# Patient Record
Sex: MALE | Race: WHITE | Employment: UNEMPLOYED | URBAN - METROPOLITAN AREA
[De-identification: names, ages, dates, MRNs, and addresses within clinical notes are randomized per-mention and may not be internally consistent; named-entity substitution may affect disease eponyms.]

---

## 2017-04-11 ENCOUNTER — OFFICE VISIT (OUTPATIENT)
Dept: FAMILY MEDICINE CLINIC | Age: 42
End: 2017-04-11

## 2017-04-11 VITALS
SYSTOLIC BLOOD PRESSURE: 110 MMHG | HEART RATE: 89 BPM | TEMPERATURE: 97.6 F | BODY MASS INDEX: 24.87 KG/M2 | DIASTOLIC BLOOD PRESSURE: 72 MMHG | HEIGHT: 72 IN | RESPIRATION RATE: 18 BRPM | WEIGHT: 183.6 LBS

## 2017-04-11 DIAGNOSIS — J30.2 SEASONAL ALLERGIC RHINITIS, UNSPECIFIED ALLERGIC RHINITIS TRIGGER: Primary | ICD-10-CM

## 2017-04-11 PROCEDURE — 99212 OFFICE O/P EST SF 10 MIN: CPT | Performed by: NURSE PRACTITIONER

## 2017-04-11 RX ORDER — MONTELUKAST SODIUM 10 MG/1
10 TABLET ORAL NIGHTLY
Qty: 30 TABLET | Refills: 3 | Status: SHIPPED | OUTPATIENT
Start: 2017-04-11 | End: 2019-03-08 | Stop reason: SDUPTHER

## 2017-04-11 ASSESSMENT — PATIENT HEALTH QUESTIONNAIRE - PHQ9
1. LITTLE INTEREST OR PLEASURE IN DOING THINGS: 0
SUM OF ALL RESPONSES TO PHQ9 QUESTIONS 1 & 2: 0
SUM OF ALL RESPONSES TO PHQ QUESTIONS 1-9: 0
2. FEELING DOWN, DEPRESSED OR HOPELESS: 0

## 2017-04-11 ASSESSMENT — ENCOUNTER SYMPTOMS
RHINORRHEA: 1
COUGH: 1
WHEEZING: 0

## 2017-04-12 ENCOUNTER — TELEPHONE (OUTPATIENT)
Dept: FAMILY MEDICINE CLINIC | Age: 42
End: 2017-04-12

## 2017-04-13 RX ORDER — FLUTICASONE PROPIONATE 50 MCG
2 SPRAY, SUSPENSION (ML) NASAL DAILY
Qty: 1 BOTTLE | Refills: 0 | Status: SHIPPED | OUTPATIENT
Start: 2017-04-13 | End: 2019-03-08 | Stop reason: SDUPTHER

## 2018-01-12 ENCOUNTER — OFFICE VISIT (OUTPATIENT)
Dept: FAMILY MEDICINE CLINIC | Age: 43
End: 2018-01-12

## 2018-01-12 ENCOUNTER — TELEPHONE (OUTPATIENT)
Dept: FAMILY MEDICINE CLINIC | Age: 43
End: 2018-01-12

## 2018-01-12 VITALS
DIASTOLIC BLOOD PRESSURE: 68 MMHG | HEART RATE: 72 BPM | HEIGHT: 72 IN | SYSTOLIC BLOOD PRESSURE: 126 MMHG | TEMPERATURE: 97.6 F | OXYGEN SATURATION: 98 % | BODY MASS INDEX: 23.16 KG/M2 | WEIGHT: 171 LBS | RESPIRATION RATE: 16 BRPM

## 2018-01-12 DIAGNOSIS — M25.511 CHRONIC RIGHT SHOULDER PAIN: ICD-10-CM

## 2018-01-12 DIAGNOSIS — Z72.0 TOBACCO ABUSE: ICD-10-CM

## 2018-01-12 DIAGNOSIS — G89.29 CHRONIC BILATERAL LOW BACK PAIN WITHOUT SCIATICA: ICD-10-CM

## 2018-01-12 DIAGNOSIS — K21.9 GASTROESOPHAGEAL REFLUX DISEASE WITHOUT ESOPHAGITIS: Primary | ICD-10-CM

## 2018-01-12 DIAGNOSIS — R10.84 GENERALIZED ABDOMINAL PAIN: Primary | ICD-10-CM

## 2018-01-12 DIAGNOSIS — D68.59 HYPERCOAGULABLE STATE (HCC): ICD-10-CM

## 2018-01-12 DIAGNOSIS — G89.29 CHRONIC RIGHT SHOULDER PAIN: ICD-10-CM

## 2018-01-12 DIAGNOSIS — M54.50 CHRONIC BILATERAL LOW BACK PAIN WITHOUT SCIATICA: ICD-10-CM

## 2018-01-12 DIAGNOSIS — G89.4 CHRONIC PAIN SYNDROME: ICD-10-CM

## 2018-01-12 PROCEDURE — 99214 OFFICE O/P EST MOD 30 MIN: CPT | Performed by: FAMILY MEDICINE

## 2018-01-12 PROCEDURE — G8484 FLU IMMUNIZE NO ADMIN: HCPCS | Performed by: FAMILY MEDICINE

## 2018-01-12 PROCEDURE — 4004F PT TOBACCO SCREEN RCVD TLK: CPT | Performed by: FAMILY MEDICINE

## 2018-01-12 PROCEDURE — G8427 DOCREV CUR MEDS BY ELIG CLIN: HCPCS | Performed by: FAMILY MEDICINE

## 2018-01-12 PROCEDURE — G8420 CALC BMI NORM PARAMETERS: HCPCS | Performed by: FAMILY MEDICINE

## 2018-01-12 RX ORDER — NICOTINE 21 MG/24HR
1 PATCH, TRANSDERMAL 24 HOURS TRANSDERMAL EVERY 24 HOURS
Qty: 30 PATCH | Refills: 3 | Status: SHIPPED | OUTPATIENT
Start: 2018-01-12 | End: 2019-01-12

## 2018-01-12 RX ORDER — PANTOPRAZOLE SODIUM 40 MG/1
TABLET, DELAYED RELEASE ORAL
COMMUNITY
Start: 2018-01-06 | End: 2019-03-08 | Stop reason: SDUPTHER

## 2018-01-12 NOTE — PROGRESS NOTES
Chief Complaint   Patient presents with    Follow-Up from Hospital     GI ISSUES.   lbp x years  This is relatively stable  In er xrays with oa  Hx of some type of back injury  Lifts a lot  Some pain down both legs  No loss of bowel bladder control  Wants more eval    abd pain has been ongoing  to er x 2  had egd  no lesions  May need further GI evaluation    Shoulder pain right  No trauma  No radiation  No neck pain    Sob has been stable  Rad hx  No cough/wheeze    Smokes  Must quit    Factor V def  abnl bw  No current sx  Needs hem eval    Here with spouse      Patient presents for  exam.        Patient Active Problem List   Diagnosis    Bilateral low back pain without sciatica    Right shoulder pain    Tobacco abuse    Chronic pain syndrome    Hypercoagulable state ([facility] [address]       has a current medication list which includes the following prescription(s): pantoprazole, nicotine, beclomethasone, albuterol sulfate hfa, mometasone, fluticasone, montelukast, budesonide, gabapentin, ibuprofen, and fluticasone. Past Medical History:   Diagnosis Date    ADHD (attention deficit hyperactivity disorder)        Past Surgical History:   Procedure Laterality Date    KNEE ARTHROSCOPY Right 1994    NOSE SURGERY Bilateral 1993    TONSILLECTOMY Bilateral 1982       family history includes Arthritis in his mother; Asthma in his mother; Diabetes in his mother; High Blood Pressure in his mother; High Cholesterol in his mother. Social History     Social History    Marital status: Single     Spouse name: N/A    Number of children: N/A    Years of education: N/A     Occupational History    Not on file.      Social History Main Topics    Smoking status: Current Every Day Smoker    Smokeless tobacco: Current User    Alcohol use 7.2 oz/week     12 Standard drinks or equivalent per week    Drug use: No    Sexual activity: Not on file     Other Topics Concern    Not on file     Social History Narrative    No narrative

## 2018-01-12 NOTE — TELEPHONE ENCOUNTER
Patient was given referral for gastro with the wrong diagnosis. Referral needs to be done to [facility] for Abdominal Pain. Please advise.

## 2018-04-30 ENCOUNTER — TELEPHONE (OUTPATIENT)
Dept: FAMILY MEDICINE CLINIC | Age: 43
End: 2018-04-30

## 2018-05-09 ENCOUNTER — TELEPHONE (OUTPATIENT)
Dept: FAMILY MEDICINE CLINIC | Age: 43
End: 2018-05-09

## 2019-03-08 ENCOUNTER — OFFICE VISIT (OUTPATIENT)
Dept: FAMILY MEDICINE CLINIC | Age: 44
End: 2019-03-08
Payer: MEDICAID

## 2019-03-08 VITALS
WEIGHT: 169 LBS | HEART RATE: 97 BPM | HEIGHT: 71 IN | TEMPERATURE: 98.3 F | OXYGEN SATURATION: 98 % | DIASTOLIC BLOOD PRESSURE: 84 MMHG | RESPIRATION RATE: 12 BRPM | BODY MASS INDEX: 23.66 KG/M2 | SYSTOLIC BLOOD PRESSURE: 130 MMHG

## 2019-03-08 DIAGNOSIS — M54.50 CHRONIC BILATERAL LOW BACK PAIN WITHOUT SCIATICA: Primary | ICD-10-CM

## 2019-03-08 DIAGNOSIS — G89.29 CHRONIC RIGHT SHOULDER PAIN: ICD-10-CM

## 2019-03-08 DIAGNOSIS — M25.511 CHRONIC RIGHT SHOULDER PAIN: ICD-10-CM

## 2019-03-08 DIAGNOSIS — D68.59 HYPERCOAGULABLE STATE (HCC): ICD-10-CM

## 2019-03-08 DIAGNOSIS — G89.4 CHRONIC PAIN SYNDROME: ICD-10-CM

## 2019-03-08 DIAGNOSIS — G89.29 CHRONIC BILATERAL LOW BACK PAIN WITHOUT SCIATICA: Primary | ICD-10-CM

## 2019-03-08 DIAGNOSIS — Z72.0 TOBACCO ABUSE: ICD-10-CM

## 2019-03-08 PROCEDURE — 4004F PT TOBACCO SCREEN RCVD TLK: CPT | Performed by: FAMILY MEDICINE

## 2019-03-08 PROCEDURE — G8427 DOCREV CUR MEDS BY ELIG CLIN: HCPCS | Performed by: FAMILY MEDICINE

## 2019-03-08 PROCEDURE — 99214 OFFICE O/P EST MOD 30 MIN: CPT | Performed by: FAMILY MEDICINE

## 2019-03-08 PROCEDURE — G8420 CALC BMI NORM PARAMETERS: HCPCS | Performed by: FAMILY MEDICINE

## 2019-03-08 PROCEDURE — G8484 FLU IMMUNIZE NO ADMIN: HCPCS | Performed by: FAMILY MEDICINE

## 2019-03-08 RX ORDER — ALBUTEROL SULFATE 90 UG/1
2 AEROSOL, METERED RESPIRATORY (INHALATION) EVERY 6 HOURS PRN
Qty: 1 INHALER | Refills: 3 | Status: SHIPPED | OUTPATIENT
Start: 2019-03-08

## 2019-03-08 RX ORDER — VARENICLINE TARTRATE 25 MG
KIT ORAL
Qty: 53 TABLET | Refills: 0 | Status: SHIPPED | OUTPATIENT
Start: 2019-03-08

## 2019-03-08 RX ORDER — VARENICLINE TARTRATE 1 MG/1
1 TABLET, FILM COATED ORAL 2 TIMES DAILY
Qty: 60 TABLET | Refills: 2 | Status: SHIPPED | OUTPATIENT
Start: 2019-03-08 | End: 2019-06-06

## 2019-03-08 RX ORDER — MONTELUKAST SODIUM 10 MG/1
10 TABLET ORAL NIGHTLY
Qty: 30 TABLET | Refills: 3 | Status: SHIPPED | OUTPATIENT
Start: 2019-03-08

## 2019-03-08 RX ORDER — FLUTICASONE PROPIONATE 50 MCG
2 SPRAY, SUSPENSION (ML) NASAL DAILY
Qty: 1 BOTTLE | Refills: 0 | Status: SHIPPED | OUTPATIENT
Start: 2019-03-08

## 2019-03-08 RX ORDER — PANTOPRAZOLE SODIUM 40 MG/1
40 TABLET, DELAYED RELEASE ORAL DAILY
Qty: 30 TABLET | Refills: 3 | Status: SHIPPED | OUTPATIENT
Start: 2019-03-08

## 2019-03-08 RX ORDER — GABAPENTIN 100 MG/1
100 CAPSULE ORAL 3 TIMES DAILY
Qty: 90 CAPSULE | Refills: 3 | Status: SHIPPED | OUTPATIENT
Start: 2019-03-08 | End: 2019-04-07

## 2019-03-08 RX ORDER — FLUTICASONE PROPIONATE 110 UG/1
2 AEROSOL, METERED RESPIRATORY (INHALATION) 2 TIMES DAILY
Qty: 3 INHALER | Refills: 3 | Status: SHIPPED | OUTPATIENT
Start: 2019-03-08 | End: 2020-03-07

## 2019-03-08 ASSESSMENT — PATIENT HEALTH QUESTIONNAIRE - PHQ9
2. FEELING DOWN, DEPRESSED OR HOPELESS: 0
SUM OF ALL RESPONSES TO PHQ QUESTIONS 1-9: 0
1. LITTLE INTEREST OR PLEASURE IN DOING THINGS: 0
SUM OF ALL RESPONSES TO PHQ9 QUESTIONS 1 & 2: 0
SUM OF ALL RESPONSES TO PHQ QUESTIONS 1-9: 0

## 2019-03-12 ENCOUNTER — TELEPHONE (OUTPATIENT)
Dept: FAMILY MEDICINE CLINIC | Age: 44
End: 2019-03-12

## 2021-11-16 ENCOUNTER — OFFICE VISIT (OUTPATIENT)
Dept: INFECTIOUS DISEASES | Age: 46
End: 2021-11-16
Payer: MEDICAID

## 2021-11-16 VITALS
HEART RATE: 77 BPM | SYSTOLIC BLOOD PRESSURE: 120 MMHG | DIASTOLIC BLOOD PRESSURE: 77 MMHG | TEMPERATURE: 98.2 F | WEIGHT: 176 LBS | BODY MASS INDEX: 24.55 KG/M2

## 2021-11-16 DIAGNOSIS — F19.10 DRUG ABUSE (HCC): ICD-10-CM

## 2021-11-16 DIAGNOSIS — Z01.84 LACK OF IMMUNITY TO HEPATITIS B VIRUS DEMONSTRATED BY SEROLOGIC TEST: ICD-10-CM

## 2021-11-16 DIAGNOSIS — B18.2 CHRONIC HEPATITIS C WITHOUT HEPATIC COMA (HCC): Primary | ICD-10-CM

## 2021-11-16 PROCEDURE — G8427 DOCREV CUR MEDS BY ELIG CLIN: HCPCS | Performed by: INTERNAL MEDICINE

## 2021-11-16 PROCEDURE — 4004F PT TOBACCO SCREEN RCVD TLK: CPT | Performed by: INTERNAL MEDICINE

## 2021-11-16 PROCEDURE — 99204 OFFICE O/P NEW MOD 45 MIN: CPT | Performed by: INTERNAL MEDICINE

## 2021-11-16 PROCEDURE — G8420 CALC BMI NORM PARAMETERS: HCPCS | Performed by: INTERNAL MEDICINE

## 2021-11-16 PROCEDURE — G8484 FLU IMMUNIZE NO ADMIN: HCPCS | Performed by: INTERNAL MEDICINE

## 2021-11-16 ASSESSMENT — ENCOUNTER SYMPTOMS: ABDOMINAL PAIN: 1

## 2021-11-16 NOTE — PROGRESS NOTES
Jeremy Collins (:  1975) is a 40 y.o. male,New patient, here for evaluation of the following chief complaint(s):  No chief complaint on file. ASSESSMENT/PLAN:  Chronic active hepatitis C  Drug abuse  Lack of hepatitis B vaccination    Hepatitis AB vaccination x3  A lengthy discussion was done with pt about Hep C illness, mode of transmission, treatment and side effects  Over emphasized the need to be drug-free because of risk of reinfection post treatment  Hepatitis C genotype  FibroScan  Urine for drug screen and alcohol screen x2  Epclusa 1 tablet once daily for 12 weeks  Follow-up CBC complete metabolic profile and hep C viral load 4 weeks after starting medication and follow-up with me 6 weeks after starting medication  Patient was instructed to abstain from drugs  Patient is going to get his family tested for hepatitis C  Subjective   SUBJECTIVE/OBJECTIVE:  HPI  Referred by  for HCV. Recently diagnosed and never treated. History of sniffing cocaine and smoking marijuana. Denies any history of alcohol abuse. Smokes 1 pack/day of cigarettes. Has been having intermittent chest pain, is in the process of getting a stress test.  Has history of peptic ulcer disease and was not taking his medications. Restarted taking them yesterday.      Past Medical History:   Diagnosis Date    ADHD (attention deficit hyperactivity disorder)    Peptic ulcer disease  Past Surgical History:   Procedure Laterality Date    KNEE ARTHROSCOPY Right     NOSE SURGERY Bilateral     TONSILLECTOMY Bilateral    Right shoulder surgery  Social History     Socioeconomic History    Marital status: Single     Spouse name: Not on file    Number of children: Not on file    Years of education: Not on file    Highest education level: Not on file   Occupational History    Not on file   Tobacco Use    Smoking status: Current Every Day Smoker    Smokeless tobacco: Current User   Vaping Use    Vaping Use: Every day    Start date: 3/8/1993    Substances: Never   Substance and Sexual Activity    Alcohol use: Yes     Alcohol/week: 12.0 standard drinks     Types: 12 Standard drinks or equivalent per week    Drug use: No    Sexual activity: Not on file   Other Topics Concern    Not on file   Social History Narrative    Not on file     Social Determinants of Health     Financial Resource Strain:     Difficulty of Paying Living Expenses: Not on file   Food Insecurity:     Worried About Running Out of Food in the Last Year: Not on file    Jaimie of Food in the Last Year: Not on file   Transportation Needs:     Lack of Transportation (Medical): Not on file    Lack of Transportation (Non-Medical):  Not on file   Physical Activity:     Days of Exercise per Week: Not on file    Minutes of Exercise per Session: Not on file   Stress:     Feeling of Stress : Not on file   Social Connections:     Frequency of Communication with Friends and Family: Not on file    Frequency of Social Gatherings with Friends and Family: Not on file    Attends Congregational Services: Not on file    Active Member of Clubs or Organizations: Not on file    Attends Club or Organization Meetings: Not on file    Marital Status: Not on file   Intimate Partner Violence:     Fear of Current or Ex-Partner: Not on file    Emotionally Abused: Not on file    Physically Abused: Not on file    Sexually Abused: Not on file   Housing Stability:     Unable to Pay for Housing in the Last Year: Not on file    Number of [place] in the Last Year: Not on file    Unstable Housing in the Last Year: Not on file     Family History   Problem Relation Age of Onset    Arthritis Mother     Asthma Mother     Diabetes Mother     High Blood Pressure Mother     High Cholesterol Mother      No Known Allergies  Current Outpatient Medications on File Prior to Visit   Medication Sig Dispense Refill    pantoprazole (PROTONIX) 40 MG tablet Take 1 tablet by mouth daily 30 tablet 3    fluticasone (FLONASE) 50 MCG/ACT nasal spray 2 sprays by Nasal route daily 1 Bottle 0    montelukast (SINGULAIR) 10 MG tablet Take 1 tablet by mouth nightly 30 tablet 3    budesonide (RHINOCORT AQUA) 32 MCG/ACT nasal spray 2 sprays by Nasal route daily 1 Bottle 0    gabapentin (NEURONTIN) 100 MG capsule Take 1 capsule by mouth 3 times daily for 30 days. 90 capsule 3    beclomethasone (QVAR) 80 MCG/ACT inhaler Inhale 1 puff into the lungs 2 times daily 1 Inhaler 3    albuterol sulfate HFA (PROAIR HFA) 108 (90 Base) MCG/ACT inhaler Inhale 2 puffs into the lungs every 6 hours as needed for Wheezing 1 Inhaler 3    fluticasone (FLOVENT HFA) 110 MCG/ACT inhaler Inhale 2 puffs into the lungs 2 times daily 3 Inhaler 3    varenicline (CHANTIX CONTINUING MONTH AGAPITO) 1 MG tablet Take 1 tablet by mouth 2 times daily 60 tablet 2    varenicline (CHANTIX STARTING MONTH AGAPITO) 0.5 MG X 11 & 1 MG X 42 tablet Take by mouth. 53 tablet 0    nicotine (NICODERM CQ) 21 MG/24HR Place 1 patch onto the skin every 24 hours 30 patch 3     No current facility-administered medications on file prior to visit. Review of Systems   Cardiovascular: Positive for chest pain. Gastrointestinal: Positive for abdominal pain. All other systems reviewed and are negative. Objective   Physical Exam  Vitals and nursing note reviewed. Constitutional:       General: He is not in acute distress. Appearance: Normal appearance. HENT:      Head: Normocephalic and atraumatic. Nose: No congestion. Eyes:      General: No scleral icterus. Cardiovascular:      Rate and Rhythm: Normal rate and regular rhythm. Heart sounds: No murmur heard. Pulmonary:      Effort: Pulmonary effort is normal. No respiratory distress. Breath sounds: Normal breath sounds. No wheezing, rhonchi or rales. Abdominal:      General: Abdomen is flat. There is no distension. Palpations: Abdomen is soft.  There is no fluid wave, hepatomegaly, splenomegaly, mass or pulsatile mass. Tenderness: There is no abdominal tenderness. Musculoskeletal:         General: No swelling. Cervical back: No rigidity. Right lower leg: No edema. Left lower leg: No edema. Lymphadenopathy:      Cervical: No cervical adenopathy. Skin:     Coloration: Skin is not jaundiced. Findings: No erythema or rash. Neurological:      General: No focal deficit present. Mental Status: He is alert and oriented to person, place, and time. Cranial Nerves: No cranial nerve deficit. Motor: No weakness. Gait: Gait normal.   Psychiatric:         Mood and Affect: Mood normal.         Behavior: Behavior normal.         Thought Content: Thought content normal.         Judgment: Judgment normal.                  HIV screen and hepatitis B surface antibody are negative  Tox screen positive for THC and cocaine 09/2021    On this date 11/16/2021 I have spent 45 minutes reviewing previous notes, test results and face to face with the patient discussing the diagnosis and importance of compliance with the treatment plan as well as documenting on the day of the visit. An electronic signature was used to authenticate this note.     --Ce Nelson MD

## 2021-11-23 LAB — ALCOHOL URINE: <10 MG/DL

## 2021-11-29 ENCOUNTER — TELEPHONE (OUTPATIENT)
Dept: INFECTIOUS DISEASES | Age: 46
End: 2021-11-29

## 2021-11-29 NOTE — TELEPHONE ENCOUNTER
Patient called to inquire of UA result, sent in last week. Patient was seen by Dr Daphne Perez on 11-16-21. Advised we only see [facility] test result, he should have had other tests. Patient states he provided the orders he was given. Just the UA-ethonol test.  Confirmed via [facility] no current labs as required by Dr Daphne Perez available. Advised patient we need further labs, he can go to the nearest [facility] lab or let us know where he goes to than Fax over orders. Patient stated he just wants them mailed. Orders mailed out to Pt.

## 2021-12-28 ENCOUNTER — ANCILLARY PROCEDURE (OUTPATIENT)
Dept: ENDOSCOPY | Age: 46
End: 2021-12-28
Payer: MEDICAID

## 2021-12-28 DIAGNOSIS — F19.10 DRUG ABUSE (HCC): ICD-10-CM

## 2021-12-28 DIAGNOSIS — B18.2 CHRONIC HEPATITIS C WITHOUT HEPATIC COMA (HCC): ICD-10-CM

## 2021-12-28 DIAGNOSIS — Z01.84 LACK OF IMMUNITY TO HEPATITIS B VIRUS DEMONSTRATED BY SEROLOGIC TEST: ICD-10-CM

## 2021-12-28 LAB
ALBUMIN SERPL-MCNC: 4.3 G/DL (ref 3.5–4.6)
ALP BLD-CCNC: 75 U/L (ref 35–104)
ALT SERPL-CCNC: 67 U/L (ref 0–41)
AMPHETAMINE SCREEN, URINE: ABNORMAL
ANION GAP SERPL CALCULATED.3IONS-SCNC: 12 MEQ/L (ref 9–15)
AST SERPL-CCNC: 33 U/L (ref 0–40)
BARBITURATE SCREEN URINE: ABNORMAL
BENZODIAZEPINE SCREEN, URINE: ABNORMAL
BILIRUB SERPL-MCNC: 0.4 MG/DL (ref 0.2–0.7)
BUN BLDV-MCNC: 9 MG/DL (ref 6–20)
CALCIUM SERPL-MCNC: 9.3 MG/DL (ref 8.5–9.9)
CANNABINOID SCREEN URINE: POSITIVE
CHLORIDE BLD-SCNC: 103 MEQ/L (ref 95–107)
CO2: 22 MEQ/L (ref 20–31)
COCAINE METABOLITE SCREEN URINE: ABNORMAL
CREAT SERPL-MCNC: 0.92 MG/DL (ref 0.7–1.2)
GFR AFRICAN AMERICAN: >60
GFR NON-AFRICAN AMERICAN: >60
GLOBULIN: 3 G/DL (ref 2.3–3.5)
GLUCOSE BLD-MCNC: 92 MG/DL (ref 70–99)
HCT VFR BLD CALC: 45.6 % (ref 42–52)
HEMOGLOBIN: 15.7 G/DL (ref 14–18)
INR BLD: 1
Lab: ABNORMAL
MCH RBC QN AUTO: 32.5 PG (ref 27–31.3)
MCHC RBC AUTO-ENTMCNC: 34.4 % (ref 33–37)
MCV RBC AUTO: 94.5 FL (ref 80–100)
METHADONE SCREEN, URINE: ABNORMAL
OPIATE SCREEN URINE: ABNORMAL
OXYCODONE URINE: ABNORMAL
PDW BLD-RTO: 12.8 % (ref 11.5–14.5)
PHENCYCLIDINE SCREEN URINE: ABNORMAL
PLATELET # BLD: 203 K/UL (ref 130–400)
POTASSIUM SERPL-SCNC: 4.2 MEQ/L (ref 3.4–4.9)
PROPOXYPHENE SCREEN: ABNORMAL
PROTHROMBIN TIME: 12.9 SEC (ref 12.3–14.9)
RBC # BLD: 4.83 M/UL (ref 4.7–6.1)
SODIUM BLD-SCNC: 137 MEQ/L (ref 135–144)
TOTAL PROTEIN: 7.3 G/DL (ref 6.3–8)
WBC # BLD: 8.4 K/UL (ref 4.8–10.8)

## 2021-12-28 PROCEDURE — 91200 LIVER ELASTOGRAPHY: CPT

## 2021-12-28 PROCEDURE — 91200 LIVER ELASTOGRAPHY: CPT | Performed by: INTERNAL MEDICINE

## 2021-12-30 LAB
HCV QNT BY NAAT IU/ML: ABNORMAL IU/ML
HCV QNT BY NAAT LOG IU/ML: 6.41 LOG IU/ML
INTERPRETATION: DETECTED

## 2022-01-03 LAB — HEPATITIS C GENOTYPE: NORMAL

## 2022-01-05 DIAGNOSIS — B18.2 HEP C W/O COMA, CHRONIC (HCC): Primary | ICD-10-CM

## 2022-01-12 ENCOUNTER — TELEPHONE (OUTPATIENT)
Dept: INFECTIOUS DISEASES | Age: 47
End: 2022-01-12

## 2022-01-13 ENCOUNTER — NURSE ONLY (OUTPATIENT)
Dept: INFECTIOUS DISEASES | Age: 47
End: 2022-01-13

## 2022-01-13 DIAGNOSIS — B18.2 HEP C W/O COMA, CHRONIC (HCC): Primary | ICD-10-CM

## 2022-01-13 NOTE — PROGRESS NOTES
MR VILLAGOMEZ CALLED INTO CLINIC FOR HEP C TEACHING. PATIENT STATES HE HAS SUPPORT OF HIS GIRLFRIEND. GENOTYPE 2B      VIRAL LOAD 6.17      FIBROSCAN  F0-F1      DISCUSSED WITH PATIENT THE GOAL OF ACHIEVING VIRAL LOAD SUPPRESSION, THE IMPORTANCE OF ROUTINE LAB WORK, POSSIBLE SIDE EFFECTS AND TIPS TO SUCCEED DURING TX.  ADVISED PATIENT TO EAT HEALTHY, DRINK PLENTY OF FLUIDS AND MAINTAIN A HEALTHY LIFESTYLE.    MR VILLAGOMEZ SEEMED EXCITED ABOUT THE START OF HEP C TREATMENT. PATIENT UNDERSTOOD TO TAKE EPCLUSA ONCE A DAY. NEVER TO DOUBLE DOSES. MR VILLAGOMEZ DISPLAYED COMPLETE UNDERSTANDING OF DOSAGE AND PLANS TO START MEDICATION 1-     HE UNDERSTANDS TO GO IMMEDIATELY TO THE EMERGENCY ROOM IF EXPERIENCING SEVERE SIDE EFFECTS; SUCH AS CHEST PAIN OR SHORTNESS OF BREATH. PATIENT PLANS TO START TREATMENT 1-  UNTIL 4-6-2022 (12 WEEKS TOTAL)  REVIEWED WITH PATIENT THE REGIMEN FOR ROUTINE LAB WORK EVERY 4 WEEKS:    1ST SET OF LABS ARE DUE THE WEEK OF: 2-  PHYSICIAN WILL ORDER NEXT SET OF LABS DURING THE NEXT APPOINTMENT. PATIENT UNDERSTANDS TO CALL [facility] AT # [phone]  FOR 1ST REFILL ON 2-2-22 AND CALL FOR 2ND REFILL ON 3-2-22    PATIENT HAS F/U APPOINTMENT WITH DR. Kemar Blackwell ON 2-24-22      ENCOURAGED MR VILLAGOMEZ TO HAVE A POSITIVE ATTITUDE ABOUT HEP C TX. OFFERED THE PATIENT AN OPPORTUNITY FOR QUESTIONS AND CONCERNS. PATIENT INSTRUCTED TO REPORT ANY CHANGE IN HEALTH STATUS AND TO CALL WITH ANY UPDATES, QUESTIONS OR CONCERNS.

## 2022-02-01 LAB
ALCOHOL URINE: <10 MG/DL
AMPHETAMINE SCREEN, URINE: ABNORMAL
BARBITURATE SCREEN, URINE: ABNORMAL
BENZODIAZEPINE SCREEN, URINE: ABNORMAL
CANNABINOID SCREEN URINE: ABNORMAL
COCAINE METABOLITE SCREEN URINE: ABNORMAL
FENTANYL SCREEN, URINE: ABNORMAL
Lab: ABNORMAL
METHADONE SCREEN, URINE: ABNORMAL
OPIATE SCREEN, URINE: ABNORMAL
OXYCODONE SCREEN URINE: ABNORMAL
PHENCYCLIDINE SCREEN URINE: ABNORMAL

## 2022-02-05 LAB — Lab: >500 NG/ML

## 2022-02-14 LAB
ALBUMIN: 4.5 G/DL (ref 3.4–5)
ALP BLD-CCNC: 71 U/L (ref 33–120)
ALT SERPL-CCNC: 13 U/L (ref 10–52)
ANION GAP SERPL CALCULATED.3IONS-SCNC: 10 MMOL/L (ref 10–20)
AST SERPL-CCNC: 19 U/L (ref 9–39)
BICARBONATE: 27 MMOL/L (ref 21–32)
BILIRUB SERPL-MCNC: 0.4 MG/DL (ref 0–1.2)
CALCIUM SERPL-MCNC: 9.4 MG/DL (ref 8.6–10.3)
CHLORIDE BLD-SCNC: 104 MMOL/L (ref 98–107)
CREAT SERPL-MCNC: 1.06 MG/DL (ref 0.5–1.3)
EGFR MALE: 88 ML/MIN/1.73M2
ERYTHROCYTE [DISTWIDTH] IN BLOOD BY AUTOMATED COUNT: 11.5 % (ref 11.5–14)
GLUCOSE: 94 MG/DL (ref 74–99)
HCT VFR BLD CALC: 49.2 % (ref 41–52)
HEMOGLOBIN: 16.5 G/DL (ref 13.5–17)
MCHC RBC AUTO-ENTMCNC: 33.5 G/DL (ref 32–36)
MCV RBC AUTO: 95 FL (ref 80–100)
PLATELET # BLD: 218 X10E9/L (ref 150–450)
POTASSIUM SERPL-SCNC: 4.3 MMOL/L (ref 3.5–5.3)
RBC # BLD: 5.19 X10E12/L (ref 4.5–5.9)
SODIUM BLD-SCNC: 137 MMOL/L (ref 136–145)
TOTAL PROTEIN: 7.5 G/DL (ref 6.4–8.2)
UREA NITROGEN: 12 MG/DL (ref 6–23)
WBC: 11.7 X10E9/L (ref 4.4–11.3)

## 2022-02-16 LAB
HCV RNA, PCR, LOG: 1.18 LOG10 IU/ML
HCV RNA,PCR: <15 IU/ML
SPECIMEN SOURCE: ABNORMAL

## 2022-02-24 ENCOUNTER — TELEMEDICINE (OUTPATIENT)
Dept: INFECTIOUS DISEASES | Age: 47
End: 2022-02-24
Payer: MEDICAID

## 2022-02-24 DIAGNOSIS — B18.2 CHRONIC HEPATITIS C WITHOUT HEPATIC COMA (HCC): Primary | ICD-10-CM

## 2022-02-24 DIAGNOSIS — F19.10 DRUG ABUSE (HCC): ICD-10-CM

## 2022-02-24 DIAGNOSIS — Z01.84 LACK OF IMMUNITY TO HEPATITIS B VIRUS DEMONSTRATED BY SEROLOGIC TEST: ICD-10-CM

## 2022-02-24 PROCEDURE — G8428 CUR MEDS NOT DOCUMENT: HCPCS | Performed by: INTERNAL MEDICINE

## 2022-02-24 PROCEDURE — G8420 CALC BMI NORM PARAMETERS: HCPCS | Performed by: INTERNAL MEDICINE

## 2022-02-24 PROCEDURE — 99214 OFFICE O/P EST MOD 30 MIN: CPT | Performed by: INTERNAL MEDICINE

## 2022-02-24 PROCEDURE — G8484 FLU IMMUNIZE NO ADMIN: HCPCS | Performed by: INTERNAL MEDICINE

## 2022-02-24 PROCEDURE — 4004F PT TOBACCO SCREEN RCVD TLK: CPT | Performed by: INTERNAL MEDICINE

## 2022-02-24 NOTE — PROGRESS NOTES
Rogerio Crandall (:  1975) is a Established patient, here for evaluation of the following:  Below is the assessment and plan developed based on review of pertinent history, physical exam, labs, studies, and medications. Assessment & Plan     Chronic active hepatitis C  Drug abuse, in remission  Lack of hepatitis B immunity, history of vaccination 30 years ago, 1 dose booster given 2021      HBS AB in 2 months  Continue Epclusa till   Follow-up CBC complete metabolic profile hep C viral load  Advised to stop cigarette smoking  HPI   Follow-up chronic active hepatitis C, history of drug abuse. Denies any more drugs other than smoking marijuana. Denies alcohol abuse. Continues to smoke 1 pack of cigarettes daily. Has plantar fasciitis and chronic back pain. Received Hep AB vaccine x1 on     Review of Systems   All other systems reviewed and are negative.          Patient-Reported Vitals  No data recorded     Physical Exam  [INSTRUCTIONS:  \"[x]\" Indicates a positive item  \"[]\" Indicates a negative item  -- DELETE ALL ITEMS NOT EXAMINED]    Constitutional: [x] Appears well-developed and well-nourished [x] No apparent distress      [] Abnormal -     Mental status: [x] Alert and awake  [x] Oriented to person/place/time [x] Able to follow commands    [] Abnormal -     Eyes:   EOM    [x]  Normal    [] Abnormal -   Sclera  [x]  Normal    [] Abnormal -          Discharge [x]  None visible   [] Abnormal -     HENT: [x] Normocephalic, atraumatic  [] Abnormal -   [x] Mouth/Throat: Mucous membranes are moist    External Ears [x] Normal  [] Abnormal -    Neck: [x] No visualized mass [] Abnormal -     Pulmonary/Chest: [x] Respiratory effort normal   [x] No visualized signs of difficulty breathing or respiratory distress        [] Abnormal -      Musculoskeletal:   [] Normal gait with no signs of ataxia         [x] Normal range of motion of neck        [] Abnormal -     Neurological:        [x] No Facial Asymmetry (Cranial nerve 7 motor function) (limited exam due to video visit)          [x] No gaze palsy        [] Abnormal -          Skin:        [x] No significant exanthematous lesions or discoloration noted on facial skin         [] Abnormal -            Psychiatric:       [x] Normal Affect [] Abnormal -        [x] No Hallucinations    Other pertinent observable physical exam findings:-     0 Result Notes    Component Ref Range & Units 2/14/22 0803 12/28/21 0825   Glucose 74 - 99 mg/dL 94  92 R    Sodium 136 - 145 mmol/L 137  137 R    Potassium 3.5 - 5.3 mmol/L 4.3  4.2 R    Chloride 98 - 107 mmol/L 104  103 R    HCO3 21 - 32 mmol/L 27     Anion Gap 10 - 20 mmol/L 10  12 R    Urea Nitrogen 6 - 23 mg/dL 12     CREATININE 0.50 - 1.3 mg/dL 1.06  0.92 R    eGFR Male >90 mL/min/1.73m2 88     Calcium 8.6 - 10.3 mg/dL 9.4  9.3 R    Albumin 3.4 - 5.0 g/dL 4.5  4.3 R    Alkaline Phosphatase 33 - 120 U/L 71  75 R    Total Protein 6.4 - 8.2 g/dL 7.5  7.3 R    AST 9 - 39 U/L 19  33 R    Total Bilirubin 0.0 - 1.2 mg/dL 0.4  0.4 R    ALT 10 - 52 U/L 13  67 High  R    CO2            0 Result Notes    Component Ref Range & Units 2/14/22 0803 12/28/21 0825   WBC 4.4 - 11.3 x10E9/L 11.7 High   8.4 R    RBC 4.50 - 5.9 x10E12/L 5.19  4.83 R    Hemoglobin 13.5 - 17 g/dL 16.5  15.7 R    Hematocrit 41.0 - 52 % 49.2  45.6 R    MCV 80 - 100 fL 95  94.5 R    MCHC 32.0 - 36 g/dL 33.5  34.4 R    Platelets 824 - 740 Z65U2/H 218  203 R    RDW-CV 11.5 - 14 % 11.5     MCH   32.5 High  R    RDW   12.8 R    Resulting Agency  [facility]          0 Result Notes     Ref Range & Units 2/14/22 0803   HCV RNA, PCR IU/mL <15.00 Abnormal     HCV RNA, PCR, Log log10 IU/mL 1.18 Abnormal     Specimen Source                   On this date 2/24/2022 I have spent 30 minutes reviewing previous notes, test results and face to face (virtual) with the patient discussing the diagnosis and importance of compliance with the treatment plan as well as documenting on the day of the visit. Rohitchi Cueto, was evaluated through a synchronous (real-time) audio-video encounter. The patient (or guardian if applicable) is aware that this is a billable service, which includes applicable co-pays. This Virtual Visit was conducted with patient's (and/or legal guardian's) consent. The visit was conducted pursuant to the emergency declaration under the [address] [place] [address] authority and the [employer] General Act. Patient identification was verified, and a caregiver was present when appropriate. The patient was located at home in a state where the provider was licensed to provide care.        --Lala Canales MD

## 2022-04-04 ENCOUNTER — OFFICE VISIT (OUTPATIENT)
Dept: PAIN MANAGEMENT | Age: 47
End: 2022-04-04
Payer: MEDICAID

## 2022-04-04 VITALS — WEIGHT: 172 LBS | BODY MASS INDEX: 23.3 KG/M2 | TEMPERATURE: 97.6 F | HEIGHT: 72 IN

## 2022-04-04 DIAGNOSIS — R20.0 NUMBNESS OF RIGHT HAND: Primary | ICD-10-CM

## 2022-04-04 PROCEDURE — 95886 MUSC TEST DONE W/N TEST COMP: CPT | Performed by: PHYSICAL MEDICINE & REHABILITATION

## 2022-04-04 PROCEDURE — 95911 NRV CNDJ TEST 9-10 STUDIES: CPT | Performed by: PHYSICAL MEDICINE & REHABILITATION

## 2022-04-04 RX ORDER — NAPROXEN 500 MG/1
TABLET ORAL
COMMUNITY
Start: 2022-02-14

## 2022-04-04 RX ORDER — METHOCARBAMOL 500 MG/1
TABLET, FILM COATED ORAL
COMMUNITY
Start: 2022-02-14

## 2022-04-04 NOTE — PROGRESS NOTES
Electromyography (EMG)/Nerve conduction studies (NCS) Report: Upper Extremities    Name: Tomasz Medrano   : 1975  Date: 2022   Physician: Maureen Montes MD        INDICATIONS: Tomasz Medrano is a 40 y.o. male who presents for electrodiagnostic evaluation for bilateral carpal tunnel syndrome by request of Dr. Eugene White. His main symptom for evaluation is right hand numbness. He is right-handed. Both limbs are necessary to examine in order to evaluate for any evidence of systemic disease as well as establish normal baseline values from which to compare any abnormal unilateral findings. The study is explained and verbal consent to proceed is obtained. NERVE CONDUCTION STUDIES:  Sensory nerve conduction studies: Right median sensory nerve conduction study to the second digit demonstrates prolonged peak latency and diminished amplitude. Left median sensory nerve conduction study to the second digit demonstrates normal peak latency and normal amplitude. Bilateral ulnar sensory nerve conduction studies to the fifth digit demonstrate normal peak latencies and amplitudes. Bilateral radial sensory nerve conduction studies to the base of the thumb demonstrate normal peak latencies and amplitudes. Bilateral upper limb temperatures are normal.     Motor nerve conduction studies: Bilateral median motor nerve conduction studies with pickup over the abductor pollicis brevis demonstrate normal distal latencies and amplitudes. Bilateral ulnar motor nerve conduction studies with pickup over the abductor digiti minimi demonstrate normal distal latencies and amplitudes. ELECTROMYOGRAPHY: A disposable monopolar needle is used to evaluate bilateral deltoid, biceps, triceps, brachioradialis, extensor indicis proprius, first dorsal interosseous, and opponens pollicis. All of the muscles sampled are free of any increased insertional activity or any abnormal spontaneous activity. Motor unit recruitment is unremarkable. Bilateral mid cervical paraspinal muscle sampling is free of any increased insertional activity or any abnormal spontaneous activity. SUMMARY:  This study is abnormal:  1. There is current electrodiagnostic evidence for a right median mononeuropathy at the wrist consistent with a clinical diagnosis of Right carpal tunnel syndrome as clinically questioned. This is mild in degree by electrical criteria. There is no active denervation on electromyography. 2. There is no current evidence for a left median mononeuropathy at the wrist as clinically questioned. 3. There is no current evidence for an active bilateral cervical motor radiculopathy or generalized large fiber sensorimotor peripheral polyneuropathy. RECOMMENDATIONS: The patient should follow up with Dr. Natalie Timmons as previously instructed. If his symptoms persist or worsen, further electrodiagnostic evaluation may be considered if the patient is agreeable. Clinical correlation is recommended.

## 2022-04-12 LAB
ALBUMIN: 4.2 G/DL (ref 3.4–5)
ALP BLD-CCNC: 64 U/L (ref 33–120)
ALT SERPL-CCNC: 12 U/L (ref 10–52)
ANION GAP SERPL CALCULATED.3IONS-SCNC: 10 MMOL/L (ref 10–20)
AST SERPL-CCNC: 17 U/L (ref 9–39)
BICARBONATE: 24 MMOL/L (ref 21–32)
BILIRUB SERPL-MCNC: 0.5 MG/DL (ref 0–1.2)
CALCIUM SERPL-MCNC: 9.2 MG/DL (ref 8.6–10.3)
CHLORIDE BLD-SCNC: 107 MMOL/L (ref 98–107)
CREAT SERPL-MCNC: 1 MG/DL (ref 0.5–1.3)
EGFR MALE: >90 ML/MIN/1.73M2
ERYTHROCYTE [DISTWIDTH] IN BLOOD BY AUTOMATED COUNT: 11.8 % (ref 11.5–14)
GLUCOSE: 101 MG/DL (ref 74–99)
HBV SURFACE AB TITR SER: <3.1 MIU/ML
HCT VFR BLD CALC: 45 % (ref 41–52)
HEMOGLOBIN: 15.3 G/DL (ref 13.5–17)
MCHC RBC AUTO-ENTMCNC: 34 G/DL (ref 32–36)
MCV RBC AUTO: 96 FL (ref 80–100)
PLATELET # BLD: 190 X10E9/L (ref 150–450)
POTASSIUM SERPL-SCNC: 3.9 MMOL/L (ref 3.5–5.3)
RBC # BLD: 4.71 X10E12/L (ref 4.5–5.9)
SODIUM BLD-SCNC: 137 MMOL/L (ref 136–145)
TOTAL PROTEIN: 7 G/DL (ref 6.4–8.2)
UREA NITROGEN: 11 MG/DL (ref 6–23)
WBC: 12.3 X10E9/L (ref 4.4–11.3)

## 2022-04-13 LAB
HCV RNA, PCR, LOG: NORMAL LOG10 IU/ML
HCV RNA,PCR: NOT DETECTED IU/ML
SPECIMEN SOURCE: NORMAL

## 2022-04-21 ENCOUNTER — OFFICE VISIT (OUTPATIENT)
Dept: INFECTIOUS DISEASES | Age: 47
End: 2022-04-21
Payer: MEDICAID

## 2022-04-21 VITALS
BODY MASS INDEX: 23.06 KG/M2 | TEMPERATURE: 97.7 F | HEART RATE: 58 BPM | WEIGHT: 170 LBS | DIASTOLIC BLOOD PRESSURE: 80 MMHG | SYSTOLIC BLOOD PRESSURE: 120 MMHG

## 2022-04-21 DIAGNOSIS — B18.2 CHRONIC HEPATITIS C WITHOUT HEPATIC COMA (HCC): Primary | ICD-10-CM

## 2022-04-21 DIAGNOSIS — Z01.84 LACK OF IMMUNITY TO HEPATITIS B VIRUS DEMONSTRATED BY SEROLOGIC TEST: ICD-10-CM

## 2022-04-21 DIAGNOSIS — K04.7 TOOTH INFECTION: ICD-10-CM

## 2022-04-21 DIAGNOSIS — F19.11 DRUG ABUSE IN REMISSION (HCC): ICD-10-CM

## 2022-04-21 PROCEDURE — G8420 CALC BMI NORM PARAMETERS: HCPCS | Performed by: INTERNAL MEDICINE

## 2022-04-21 PROCEDURE — 99214 OFFICE O/P EST MOD 30 MIN: CPT | Performed by: INTERNAL MEDICINE

## 2022-04-21 PROCEDURE — 4004F PT TOBACCO SCREEN RCVD TLK: CPT | Performed by: INTERNAL MEDICINE

## 2022-04-21 PROCEDURE — G8427 DOCREV CUR MEDS BY ELIG CLIN: HCPCS | Performed by: INTERNAL MEDICINE

## 2022-04-21 RX ORDER — AMOXICILLIN 500 MG/1
500 CAPSULE ORAL 3 TIMES DAILY
Qty: 30 CAPSULE | Refills: 0 | Status: SHIPPED | OUTPATIENT
Start: 2022-04-21 | End: 2022-05-01

## 2022-04-21 NOTE — PROGRESS NOTES
Denise Costa (:  1975) is a 40 y.o. male,Established patient, here for evaluation of the following chief complaint(s):  No chief complaint on file. ASSESSMENT/PLAN:  Chronic active hepatitis C 2b, F0-1  Drug abuse, in remission  Lack of hepatitis B immunity, with negative hepatitis B surface antibody despite recent booster. history of vaccination 30 years ago, 1 dose booster given 2021  Tooth infection    Referred to dentist  Amoxicillin 500 mg p.o. 3 times daily  Hepatitis B vaccine x3  CBC complete metabolic profile hep C viral load in 3 months  Follow-up in 3 to 4 months  Follow-up with primary care physician to get Chantix refilled  SUBJECTIVE/OBJECTIVE:  HPI  Follow-up chronic active hepatitis C, finished 12 weeks course of Epclusa on , well-tolerated except for mild epigastric discomfort that is currently resolved. Denies any alcohol or drug abuse. Trying to stop cigarette smoking  Persistent lack of hepatitis B immunity despite a recent booster  Had left upper tooth ache for few months, could be contributing to mild leukocytosis    Review of Systems   Neurological: Positive for numbness (.  Hand numbness). All other systems reviewed and are negative. Physical Exam  Vitals and nursing note reviewed. Constitutional:       General: He is not in acute distress. Appearance: Normal appearance. HENT:      Head: Normocephalic and atraumatic. Nose: No congestion. Eyes:      General: No scleral icterus. Cardiovascular:      Rate and Rhythm: Normal rate and regular rhythm. Heart sounds: No murmur heard. Pulmonary:      Effort: Pulmonary effort is normal. No respiratory distress. Breath sounds: Normal breath sounds. No wheezing or rales. Abdominal:      General: Abdomen is flat. There is no distension. Palpations: Abdomen is soft. There is no fluid wave, hepatomegaly, splenomegaly or mass. Tenderness:  There is no abdominal documenting on the day of the visit. An electronic signature was used to authenticate this note.     --Huong Baxter MD

## 2022-08-08 ENCOUNTER — TELEPHONE (OUTPATIENT)
Dept: INFECTIOUS DISEASES | Age: 47
End: 2022-08-08

## 2022-11-03 ENCOUNTER — TELEPHONE (OUTPATIENT)
Dept: INFECTIOUS DISEASES | Age: 47
End: 2022-11-03

## 2022-11-03 NOTE — TELEPHONE ENCOUNTER
Mr. Shaquille Carter called. States he received results from a Plasma center that he is positive for Hep C again. Reminded Mr. Shaquille Carter that he never submitted lab work ordered by Dr. Kassy Bhatti for July 2022. Mr. Shaquille Carter plans to submit lab work for Hep C viral load next week. Depending on results will determine if plasma center results are accurate. Follow up appointment will be scheduled with Dr. Kassy Bhatti once Hep C viral load is resulted  Mr. Shaquille Carter will call the office once lab work is submitted.

## 2022-11-14 ENCOUNTER — TELEPHONE (OUTPATIENT)
Dept: INFECTIOUS DISEASES | Age: 47
End: 2022-11-14

## 2022-11-14 NOTE — TELEPHONE ENCOUNTER
Patient called to inquire of Lab Orders. States he would rather go to a Lab department closer to his home and request to have orders Mailed out to him. Confirmed address. Lab Orders to be Mailed; CBC, CMP, and HepC RNA. Advised to call this office when he completes the Labs.    Patient with girlfriend and they verbalized understanding,

## 2022-11-21 LAB
ALBUMIN: 4.4 G/DL (ref 3.4–5)
ALP BLD-CCNC: 71 U/L (ref 33–120)
ALT SERPL-CCNC: 10 U/L (ref 10–52)
ANION GAP SERPL CALCULATED.3IONS-SCNC: 11 MMOL/L (ref 10–20)
AST SERPL-CCNC: 17 U/L (ref 9–39)
BICARBONATE: 25 MMOL/L (ref 21–32)
BILIRUB SERPL-MCNC: 0.5 MG/DL (ref 0–1.2)
CALCIUM SERPL-MCNC: 9.5 MG/DL (ref 8.6–10.3)
CHLORIDE BLD-SCNC: 104 MMOL/L (ref 98–107)
CREAT SERPL-MCNC: 0.96 MG/DL (ref 0.5–1.3)
EGFR MALE: >90 ML/MIN/1.73M2
ERYTHROCYTE [DISTWIDTH] IN BLOOD BY AUTOMATED COUNT: 11.9 % (ref 11.5–14)
GLUCOSE: 108 MG/DL (ref 74–99)
HCT VFR BLD CALC: 46 % (ref 41–52)
HEMOGLOBIN: 15.5 G/DL (ref 13.5–17)
MCHC RBC AUTO-ENTMCNC: 33.7 G/DL (ref 32–36)
MCV RBC AUTO: 97 FL (ref 80–100)
PLATELET # BLD: 194 X10E9/L (ref 150–450)
POTASSIUM SERPL-SCNC: 4.1 MMOL/L (ref 3.5–5.3)
RBC # BLD: 4.75 X10E12/L (ref 4.5–5.9)
SODIUM BLD-SCNC: 136 MMOL/L (ref 136–145)
TOTAL PROTEIN: 7 G/DL (ref 6.4–8.2)
UREA NITROGEN: 9 MG/DL (ref 6–23)
WBC: 10.4 X10E9/L (ref 4.4–11.3)

## 2022-11-22 LAB
HCV RNA, PCR, LOG: NORMAL LOG10 IU/ML
HCV RNA,PCR: NOT DETECTED IU/ML
SPECIMEN SOURCE: NORMAL

## 2023-06-21 ENCOUNTER — TELEPHONE (OUTPATIENT)
Dept: INFECTIOUS DISEASES | Age: 48
End: 2023-06-21

## 2023-06-21 DIAGNOSIS — B18.2 HEP C W/O COMA, CHRONIC (HCC): Primary | ICD-10-CM

## 2023-06-21 NOTE — TELEPHONE ENCOUNTER
Recd call from Mr. Bell- Inquired if a letter can be written for clearance to donate plasma. Viral load was undetected November 2022  F/u appointment scheduled for 7-14-23 with Dr. Shanell Little. Lab orders placed in Epic  Clearance letter will be discussed with Dr. Shanell Little during time of visit.

## 2023-07-07 DIAGNOSIS — B18.2 HEP C W/O COMA, CHRONIC (HCC): ICD-10-CM

## 2023-07-07 LAB
ALBUMIN SERPL-MCNC: 4.5 G/DL (ref 3.5–4.6)
ALP SERPL-CCNC: 86 U/L (ref 35–104)
ALT SERPL-CCNC: 9 U/L (ref 0–41)
ANION GAP SERPL CALCULATED.3IONS-SCNC: 9 MEQ/L (ref 9–15)
AST SERPL-CCNC: 12 U/L (ref 0–40)
BILIRUB SERPL-MCNC: 0.3 MG/DL (ref 0.2–0.7)
BUN SERPL-MCNC: 10 MG/DL (ref 6–20)
CALCIUM SERPL-MCNC: 9.8 MG/DL (ref 8.5–9.9)
CHLORIDE SERPL-SCNC: 104 MEQ/L (ref 95–107)
CO2 SERPL-SCNC: 27 MEQ/L (ref 20–31)
CREAT SERPL-MCNC: 0.87 MG/DL (ref 0.7–1.2)
ERYTHROCYTE [DISTWIDTH] IN BLOOD BY AUTOMATED COUNT: 13 % (ref 11.5–14.5)
GLOBULIN SER CALC-MCNC: 2.8 G/DL (ref 2.3–3.5)
GLUCOSE SERPL-MCNC: 117 MG/DL (ref 70–99)
HCT VFR BLD AUTO: 47.3 % (ref 42–52)
HGB BLD-MCNC: 16 G/DL (ref 14–18)
MCH RBC QN AUTO: 31.8 PG (ref 27–31.3)
MCHC RBC AUTO-ENTMCNC: 33.8 % (ref 33–37)
MCV RBC AUTO: 94.1 FL (ref 79–92.2)
PLATELET # BLD AUTO: 196 K/UL (ref 130–400)
POTASSIUM SERPL-SCNC: 5.6 MEQ/L (ref 3.4–4.9)
PROT SERPL-MCNC: 7.3 G/DL (ref 6.3–8)
RBC # BLD AUTO: 5.03 M/UL (ref 4.7–6.1)
SODIUM SERPL-SCNC: 140 MEQ/L (ref 135–144)
WBC # BLD AUTO: 10.7 K/UL (ref 4.8–10.8)

## 2023-07-10 LAB
HCV RNA SERPL NAA+PROBE-ACNC: NOT DETECTED IU/ML
HCV RNA SERPL NAA+PROBE-LOG IU: NOT DETECTED LOG IU/ML
HCV RNA SERPL QL NAA+PROBE: NOT DETECTED

## 2023-11-11 PROBLEM — M79.672 CHRONIC PAIN OF BOTH FEET: Status: ACTIVE | Noted: 2023-11-11

## 2023-11-11 PROBLEM — G89.29 CHRONIC PAIN OF BOTH FEET: Status: ACTIVE | Noted: 2023-11-11

## 2023-11-11 PROBLEM — M54.50 LOW BACK PAIN: Status: ACTIVE | Noted: 2023-11-11

## 2023-11-11 PROBLEM — M54.16 LUMBAR RADICULOPATHY, ACUTE: Status: ACTIVE | Noted: 2023-11-11

## 2023-11-11 PROBLEM — D68.51 FACTOR V LEIDEN MUTATION (MULTI): Status: ACTIVE | Noted: 2023-11-11

## 2023-11-11 PROBLEM — R74.8 ELEVATED LIVER ENZYMES: Status: ACTIVE | Noted: 2023-11-11

## 2023-11-11 PROBLEM — K58.1 IRRITABLE BOWEL SYNDROME WITH CONSTIPATION: Status: ACTIVE | Noted: 2023-11-11

## 2023-11-11 PROBLEM — L72.3 SEBACEOUS CYST: Status: ACTIVE | Noted: 2023-11-11

## 2023-11-11 PROBLEM — J02.9 SORE THROAT: Status: ACTIVE | Noted: 2023-11-11

## 2023-11-11 PROBLEM — M54.2 NECK PAIN: Status: ACTIVE | Noted: 2023-11-11

## 2023-11-11 PROBLEM — M25.69 BACK STIFFNESS: Status: ACTIVE | Noted: 2023-11-11

## 2023-11-11 PROBLEM — M72.2 PLANTAR FASCIITIS, RIGHT: Status: ACTIVE | Noted: 2023-11-11

## 2023-11-11 PROBLEM — S76.119A QUADRICEPS STRAIN: Status: ACTIVE | Noted: 2023-11-11

## 2023-11-11 PROBLEM — M79.671 CHRONIC PAIN OF BOTH FEET: Status: ACTIVE | Noted: 2023-11-11

## 2023-11-11 PROBLEM — M47.816 DJD (DEGENERATIVE JOINT DISEASE), LUMBAR: Status: ACTIVE | Noted: 2023-11-11

## 2023-11-11 PROBLEM — R07.9 CHEST PAIN: Status: ACTIVE | Noted: 2023-11-11

## 2023-11-11 PROBLEM — B19.20 HEPATITIS-C: Status: ACTIVE | Noted: 2023-11-11

## 2023-11-11 PROBLEM — Z04.9 CONDITION NOT FOUND: Status: ACTIVE | Noted: 2023-11-11

## 2023-11-11 PROBLEM — M65.4 DE QUERVAIN'S TENOSYNOVITIS: Status: ACTIVE | Noted: 2023-11-11

## 2023-11-11 PROBLEM — M25.579 ANKLE PAIN: Status: ACTIVE | Noted: 2023-11-11

## 2023-11-11 PROBLEM — M24.411: Status: ACTIVE | Noted: 2023-11-11

## 2023-11-11 PROBLEM — G56.03 BILATERAL CARPAL TUNNEL SYNDROME: Status: ACTIVE | Noted: 2023-11-11

## 2023-11-11 PROBLEM — S43.409A SHOULDER SPRAIN: Status: ACTIVE | Noted: 2023-11-11

## 2023-11-11 PROBLEM — M47.816 SPONDYLOSIS OF LUMBAR SPINE: Status: ACTIVE | Noted: 2023-11-11

## 2023-11-11 PROBLEM — M76.60 ACHILLES TENDON PAIN: Status: ACTIVE | Noted: 2023-11-11

## 2023-11-11 PROBLEM — M25.311 INSTABILITY OF RIGHT SHOULDER JOINT: Status: ACTIVE | Noted: 2023-11-11

## 2023-11-11 PROBLEM — M79.646 THUMB PAIN: Status: ACTIVE | Noted: 2023-11-11

## 2023-11-11 RX ORDER — SUCRALFATE 1 G/1
TABLET ORAL
COMMUNITY

## 2023-11-11 RX ORDER — DULOXETIN HYDROCHLORIDE 30 MG/1
CAPSULE, DELAYED RELEASE ORAL
COMMUNITY

## 2023-11-11 RX ORDER — PANTOPRAZOLE SODIUM 20 MG/1
1 TABLET, DELAYED RELEASE ORAL DAILY
COMMUNITY
Start: 2021-08-24 | End: 2024-02-13 | Stop reason: SDUPTHER

## 2023-11-11 RX ORDER — VARENICLINE TARTRATE 0.5 (11)-1
KIT ORAL
COMMUNITY
End: 2024-02-13 | Stop reason: SDUPTHER

## 2023-11-11 RX ORDER — POLYETHYLENE GLYCOL 3350 17 G/17G
17 POWDER, FOR SOLUTION ORAL
COMMUNITY
Start: 2023-08-21 | End: 2024-02-13 | Stop reason: SDUPTHER

## 2024-02-12 NOTE — PROGRESS NOTES
Subjective   Patient ID: Santos Contreras is a 48 y.o. male who presents for No chief complaint on file..  HPI    eczema cream  Skin condition stable    Go over meds to make sure taking the correct stuff   Questions about ADD  Discussed treatment options with the patient he will consider    Mood stable  No suicidal ideation no psychotic or manic symptoms    GERD stable  No red flag symptoms    Tobacco abuse  Recommend smoking  He would like Chantix    IBS stable  Positive constipation  Discussed treatment options    Factor V deficiency stable consider hematology evaluation recheck 6 months and as needed  Medicines as above recommend testing as above refer to GI      Review of Systems   Constitutional:  Negative for activity change and fatigue.   HENT:  Negative for congestion and sore throat.    Eyes:  Negative for discharge.   Respiratory:  Negative for cough, chest tightness and shortness of breath.    Cardiovascular:  Negative for chest pain and leg swelling.   Gastrointestinal:  Negative for abdominal pain, blood in stool, constipation, diarrhea, nausea and vomiting.   Endocrine: Negative for cold intolerance and heat intolerance.   Genitourinary:  Negative for difficulty urinating and hematuria.   Musculoskeletal:  Negative for arthralgias, back pain, gait problem, myalgias and neck pain.   Allergic/Immunologic: Negative for environmental allergies.   Neurological:  Negative for dizziness, syncope, weakness, numbness and headaches.   Hematological:  Negative for adenopathy. Does not bruise/bleed easily.   Psychiatric/Behavioral:  Negative for dysphoric mood. The patient is not nervous/anxious.    All other systems reviewed and are negative.      Objective   /83 (BP Cuff Size: Large adult)   Pulse 87   Ht 1.829 m (6')   Wt 77.5 kg (170 lb 12.8 oz)   SpO2 98%   BMI 23.16 kg/m²    Physical Exam  Vitals and nursing note reviewed.   Constitutional:       General: He is not in acute distress.     Appearance:  Normal appearance.   HENT:      Head: Normocephalic and atraumatic.      Right Ear: Tympanic membrane, ear canal and external ear normal.      Left Ear: Tympanic membrane, ear canal and external ear normal.      Nose: Nose normal.      Mouth/Throat:      Mouth: Mucous membranes are moist.      Pharynx: Oropharynx is clear. No oropharyngeal exudate or posterior oropharyngeal erythema.   Eyes:      Extraocular Movements: Extraocular movements intact.      Conjunctiva/sclera: Conjunctivae normal.      Pupils: Pupils are equal, round, and reactive to light.   Cardiovascular:      Rate and Rhythm: Normal rate and regular rhythm.      Pulses: Normal pulses.      Heart sounds: Normal heart sounds. No murmur heard.  Pulmonary:      Effort: Pulmonary effort is normal. No respiratory distress.      Breath sounds: Normal breath sounds. No wheezing or rales.   Abdominal:      General: Abdomen is flat. Bowel sounds are normal. There is no distension.      Palpations: Abdomen is soft. There is no mass.      Tenderness: There is no abdominal tenderness.   Musculoskeletal:         General: No swelling or deformity. Normal range of motion.      Cervical back: Normal range of motion and neck supple.      Right lower leg: No edema.      Left lower leg: No edema.   Lymphadenopathy:      Cervical: No cervical adenopathy.   Skin:     General: Skin is warm and dry.      Capillary Refill: Capillary refill takes less than 2 seconds.      Findings: No lesion or rash.   Neurological:      General: No focal deficit present.      Mental Status: He is alert and oriented to person, place, and time.      Cranial Nerves: No cranial nerve deficit.      Motor: No weakness.   Psychiatric:         Mood and Affect: Mood normal.         Behavior: Behavior normal.         Thought Content: Thought content normal.         Judgment: Judgment normal.         Assessment/Plan   Problem List Items Addressed This Visit       Elevated liver enzymes    Factor V  Leiden mutation (CMS/[facility])    Irritable bowel syndrome with constipation    Relevant Medications    polyethylene glycol (Miralax) 17 gram/dose powder    Other Relevant Orders    Referral to Gastroenterology    Mild intermittent asthma, unspecified whether complicated - Primary     Other Visit Diagnoses       Tobacco abuse        Relevant Medications    varenicline (Chantix STANISLAW) 0.5 mg (11)- 1 mg (42) tablet    Other Relevant Orders    Follow Up In [facility] - PCP    Gastroesophageal reflux disease without esophagitis        Relevant Medications    pantoprazole (ProtoNix) 20 mg EC tablet            Patient education provided.  Stay current with age appropriate health maintenance as instructed.  Appointment here or ER with new or worsening symptoms'  Keep appropriate follow-up visit.  Stay current with proper immunizations   Per patient request referral to gastroenterology and do colon cancer screening   Recheck 6 months and as needed

## 2024-02-13 ENCOUNTER — OFFICE VISIT (OUTPATIENT)
Dept: PRIMARY CARE | Facility: CLINIC | Age: 49
End: 2024-02-13
Payer: COMMERCIAL

## 2024-02-13 VITALS
SYSTOLIC BLOOD PRESSURE: 130 MMHG | HEIGHT: 72 IN | BODY MASS INDEX: 23.13 KG/M2 | WEIGHT: 170.8 LBS | OXYGEN SATURATION: 98 % | DIASTOLIC BLOOD PRESSURE: 83 MMHG | HEART RATE: 87 BPM

## 2024-02-13 DIAGNOSIS — J45.20 MILD INTERMITTENT ASTHMA, UNSPECIFIED WHETHER COMPLICATED (HHS-HCC): Primary | ICD-10-CM

## 2024-02-13 DIAGNOSIS — K21.9 GASTROESOPHAGEAL REFLUX DISEASE WITHOUT ESOPHAGITIS: ICD-10-CM

## 2024-02-13 DIAGNOSIS — R74.8 ELEVATED LIVER ENZYMES: ICD-10-CM

## 2024-02-13 DIAGNOSIS — K58.1 IRRITABLE BOWEL SYNDROME WITH CONSTIPATION: ICD-10-CM

## 2024-02-13 DIAGNOSIS — Z72.0 TOBACCO ABUSE: ICD-10-CM

## 2024-02-13 DIAGNOSIS — D68.51 FACTOR V LEIDEN MUTATION (MULTI): ICD-10-CM

## 2024-02-13 PROBLEM — F33.0 MILD EPISODE OF RECURRENT MAJOR DEPRESSIVE DISORDER (CMS-HCC): Status: ACTIVE | Noted: 2024-02-13

## 2024-02-13 PROCEDURE — 99214 OFFICE O/P EST MOD 30 MIN: CPT | Performed by: FAMILY MEDICINE

## 2024-02-13 RX ORDER — DULOXETIN HYDROCHLORIDE 30 MG/1
CAPSULE, DELAYED RELEASE ORAL
Status: CANCELLED | OUTPATIENT
Start: 2024-02-13

## 2024-02-13 RX ORDER — PANTOPRAZOLE SODIUM 20 MG/1
20 TABLET, DELAYED RELEASE ORAL DAILY
Qty: 30 TABLET | Refills: 3 | Status: SHIPPED | OUTPATIENT
Start: 2024-02-13

## 2024-02-13 RX ORDER — POLYETHYLENE GLYCOL 3350 17 G/17G
17 POWDER, FOR SOLUTION ORAL DAILY
Qty: 510 G | Refills: 3 | Status: SHIPPED | OUTPATIENT
Start: 2024-02-13 | End: 2024-06-12

## 2024-02-13 RX ORDER — VARENICLINE TARTRATE 0.5 (11)-1
KIT ORAL
Qty: 53 EACH | Refills: 3 | Status: SHIPPED | OUTPATIENT
Start: 2024-02-13

## 2024-02-13 ASSESSMENT — ENCOUNTER SYMPTOMS
BRUISES/BLEEDS EASILY: 0
COUGH: 0
DIZZINESS: 0
ARTHRALGIAS: 0
HEMATURIA: 0
NUMBNESS: 0
SORE THROAT: 0
EYE DISCHARGE: 0
DIFFICULTY URINATING: 0
FATIGUE: 0
NAUSEA: 0
ABDOMINAL PAIN: 0
NERVOUS/ANXIOUS: 0
BLOOD IN STOOL: 0
WEAKNESS: 0
MYALGIAS: 0
CONSTIPATION: 0
BACK PAIN: 0
VOMITING: 0
DYSPHORIC MOOD: 0
SHORTNESS OF BREATH: 0
DIARRHEA: 0
HEADACHES: 0
ADENOPATHY: 0
CHEST TIGHTNESS: 0
NECK PAIN: 0
ACTIVITY CHANGE: 0

## 2024-03-11 ENCOUNTER — APPOINTMENT (OUTPATIENT)
Dept: GASTROENTEROLOGY | Facility: CLINIC | Age: 49
End: 2024-03-11
Payer: COMMERCIAL

## 2024-03-14 PROBLEM — G56.00 CARPAL TUNNEL SYNDROME: Status: ACTIVE | Noted: 2024-03-14

## 2024-03-14 PROBLEM — R20.2 NUMBNESS AND TINGLING SENSATION OF SKIN: Status: ACTIVE | Noted: 2024-03-14

## 2024-03-14 PROBLEM — K29.90 GASTRITIS AND DUODENITIS: Status: ACTIVE | Noted: 2018-01-06

## 2024-03-14 PROBLEM — K21.9 GASTROESOPHAGEAL REFLUX DISEASE WITHOUT ESOPHAGITIS: Status: ACTIVE | Noted: 2024-03-14

## 2024-03-14 PROBLEM — R20.0 NUMBNESS AND TINGLING SENSATION OF SKIN: Status: ACTIVE | Noted: 2024-03-14

## 2024-03-14 PROBLEM — J45.909 ASTHMA (HHS-HCC): Status: ACTIVE | Noted: 2024-03-14

## 2024-03-14 RX ORDER — AZITHROMYCIN 250 MG/1
TABLET, FILM COATED ORAL
COMMUNITY
Start: 2020-08-18 | End: 2024-04-16 | Stop reason: ALTCHOICE

## 2024-03-14 RX ORDER — GABAPENTIN 100 MG/1
100 CAPSULE ORAL
COMMUNITY
Start: 2019-03-08

## 2024-03-14 RX ORDER — NAPROXEN 500 MG/1
TABLET ORAL
COMMUNITY
Start: 2022-02-14

## 2024-03-14 RX ORDER — MONTELUKAST SODIUM 10 MG/1
1 TABLET ORAL NIGHTLY
COMMUNITY
Start: 2019-03-08

## 2024-03-14 RX ORDER — OXYCODONE AND ACETAMINOPHEN 5; 325 MG/1; MG/1
TABLET ORAL EVERY 12 HOURS
COMMUNITY
Start: 2020-10-06 | End: 2024-04-16 | Stop reason: ALTCHOICE

## 2024-03-14 RX ORDER — METHOCARBAMOL 500 MG/1
TABLET, FILM COATED ORAL
COMMUNITY
Start: 2022-02-14

## 2024-03-14 RX ORDER — ONDANSETRON 8 MG/1
TABLET, ORALLY DISINTEGRATING ORAL
COMMUNITY
Start: 2023-05-09

## 2024-03-14 RX ORDER — OSELTAMIVIR PHOSPHATE 75 MG/1
CAPSULE ORAL
COMMUNITY
Start: 2023-05-09 | End: 2024-04-16 | Stop reason: ALTCHOICE

## 2024-03-14 RX ORDER — LIDOCAINE HYDROCHLORIDE 10 MG/ML
INJECTION, SOLUTION EPIDURAL; INFILTRATION; INTRACAUDAL; PERINEURAL
COMMUNITY
Start: 2022-03-03 | End: 2024-04-16 | Stop reason: ALTCHOICE

## 2024-03-14 RX ORDER — FLUTICASONE PROPIONATE 110 UG/1
2 AEROSOL, METERED RESPIRATORY (INHALATION) 2 TIMES DAILY
COMMUNITY
Start: 2019-03-08

## 2024-03-14 RX ORDER — HYDROCODONE BITARTRATE AND ACETAMINOPHEN 5; 325 MG/1; MG/1
TABLET ORAL
COMMUNITY
Start: 2023-08-21 | End: 2024-04-16 | Stop reason: ALTCHOICE

## 2024-03-14 RX ORDER — NIRMATRELVIR AND RITONAVIR 300-100 MG
KIT ORAL
COMMUNITY
Start: 2022-09-13 | End: 2024-04-16 | Stop reason: ALTCHOICE

## 2024-03-14 RX ORDER — IBUPROFEN 200 MG
1 TABLET ORAL
COMMUNITY
Start: 2018-01-12 | End: 2024-04-16 | Stop reason: ALTCHOICE

## 2024-03-14 RX ORDER — METHYLPREDNISOLONE 4 MG/1
TABLET ORAL
COMMUNITY
Start: 2022-02-14 | End: 2024-04-16 | Stop reason: ALTCHOICE

## 2024-03-14 RX ORDER — BENZOCAINE .13; .15; .5; 2 G/100G; G/100G; G/100G; G/100G
2 GEL ORAL DAILY
COMMUNITY
Start: 2019-03-08

## 2024-03-14 RX ORDER — ALBUTEROL SULFATE 90 UG/1
2 AEROSOL, METERED RESPIRATORY (INHALATION) EVERY 6 HOURS PRN
COMMUNITY
Start: 2019-03-08

## 2024-03-18 ENCOUNTER — APPOINTMENT (OUTPATIENT)
Dept: GASTROENTEROLOGY | Facility: CLINIC | Age: 49
End: 2024-03-18
Payer: COMMERCIAL

## 2024-03-18 ENCOUNTER — OFFICE VISIT (OUTPATIENT)
Dept: GASTROENTEROLOGY | Facility: CLINIC | Age: 49
End: 2024-03-18
Payer: COMMERCIAL

## 2024-03-18 VITALS
BODY MASS INDEX: 22.47 KG/M2 | TEMPERATURE: 97.7 F | HEART RATE: 87 BPM | SYSTOLIC BLOOD PRESSURE: 129 MMHG | HEIGHT: 72 IN | OXYGEN SATURATION: 95 % | WEIGHT: 165.9 LBS | DIASTOLIC BLOOD PRESSURE: 84 MMHG

## 2024-03-18 DIAGNOSIS — Z12.11 COLON CANCER SCREENING: ICD-10-CM

## 2024-03-18 DIAGNOSIS — R10.30 LOWER ABDOMINAL PAIN: Primary | ICD-10-CM

## 2024-03-18 DIAGNOSIS — K58.1 IRRITABLE BOWEL SYNDROME WITH CONSTIPATION: ICD-10-CM

## 2024-03-18 PROCEDURE — 99204 OFFICE O/P NEW MOD 45 MIN: CPT | Performed by: INTERNAL MEDICINE

## 2024-03-18 RX ORDER — DOCUSATE SODIUM 100 MG/1
100 CAPSULE, LIQUID FILLED ORAL 2 TIMES DAILY
Qty: 60 CAPSULE | Refills: 2 | Status: SHIPPED | OUTPATIENT
Start: 2024-03-18 | End: 2024-06-16

## 2024-03-18 RX ORDER — POLYETHYLENE GLYCOL 3350
1 POWDER (GRAM) MISCELLANEOUS DAILY
Qty: 500 G | Refills: 3 | Status: SHIPPED | OUTPATIENT
Start: 2024-03-18

## 2024-03-18 RX ORDER — POLYETHYLENE GLYCOL-3350 AND ELECTROLYTES WITH FLAVOR PACK 240; 5.84; 2.98; 6.72; 22.72 G/278.26G; G/278.26G; G/278.26G; G/278.26G; G/278.26G
4000 POWDER, FOR SOLUTION ORAL ONCE
Qty: 4000 ML | Refills: 0 | Status: SHIPPED | OUTPATIENT
Start: 2024-03-18 | End: 2024-03-18

## 2024-03-18 NOTE — H&P (VIEW-ONLY)
Subjective   Patient ID: Santos Contreras is a 48 y.o. male who presents for Constipation (Moves bowels daily denies blood/mucus. States he was scheduled for colon last year but felt prep did not work and he did not reschedule. ).  HPI  He has factor v leiden mutation. Chronic constipation. GERD.   No hx of colon/egd    BM: every 2 days/.   Has to some time disimpact himself.   Has rectal pain.     Has stomach pains.   Current Outpatient Medications on File Prior to Visit   Medication Sig Dispense Refill    DULoxetine (Cymbalta) 30 mg DR capsule TAKE 1 TABLET ONCE DAILY FOR 7 DAYS THEN INCREASE IT TO 2 TABLETS ONCE DAILY      fluticasone (Flovent) 110 mcg/actuation inhaler Inhale 2 puffs 2 times a day.      montelukast (Singulair) 10 mg tablet Take 1 tablet (10 mg) by mouth once daily at bedtime.      albuterol 90 mcg/actuation inhaler Inhale 2 puffs every 6 hours if needed.      azithromycin (Zithromax) 250 mg tablet Take by mouth.      budesonide (Rhinocort AQ) 32 mcg/actuation nasal spray Administer 2 sprays into affected nostril(s) once daily.      gabapentin (Neurontin) 100 mg capsule Take 1 capsule (100 mg) by mouth.      HYDROcodone-acetaminophen (Norco) 5-325 mg tablet       lidocaine PF (Xylocaine) 10 mg/mL (1 %) injection Lidocaine HCl - 1 % Injection Solution USE AS DIRECTED. Quantity: 0 Refills: 0 Ordered: 3-Mar-2022 Steven Sandy DO Start : 3-Mar-2022 Complete      methocarbamol (Robaxin) 500 mg tablet Take by mouth.      methylPREDNISolone (Medrol, Diego,) 4 mg tablets Take by mouth.      naproxen (Naprosyn) 500 mg tablet Take by mouth.      nicotine (Nicoderm CQ) 21 mg/24 hr patch Place 1 patch on the skin.      nirmatrelvir-ritonavir (Paxlovid) 300 mg (150 mg x 2)-100 mg tablet therapy pack Take by mouth.      ondansetron ODT (Zofran-ODT) 8 mg disintegrating tablet       oseltamivir (Tamiflu) 75 mg capsule       oxyCODONE-acetaminophen (Percocet) 5-325 mg tablet Take by mouth every 12 hours.       pantoprazole (ProtoNix) 20 mg EC tablet Take 1 tablet (20 mg) by mouth once daily. (Patient not taking: Reported on 3/18/2024) 30 tablet 3    plecanatide (Trulance) tablet tablet Take by mouth.      polyethylene glycol (Miralax) 17 gram/dose powder Take 17 g by mouth once daily. (Patient not taking: Reported on 3/18/2024) 510 g 3    sucralfate (Carafate) 1 gram tablet 1 tab(s) orally 3 times a day 1 hour before meals      triamcinolone acetonide (Kenalog) 10 mg/mL injection Kenalog 10 MG/ML Injection Suspension USE AS DIRECTED. Quantity: 0 Refills: 0 Ordered: 3-Mar-2022 Steven Sandy DO Start : 3-Mar-2022 Complete      varenicline (Chantix STANISLAW) 0.5 mg (11)- 1 mg (42) tablet TAKE ONE 0.5MG TABLET DAILY ON DAYS 1-3, THEN ONE 0.5MG TABLET TWICE DAILY ON DAYS 4-7, THEN ONE 1MG TABLET TWICE DAILY THEREAFTER. (Patient not taking: Reported on 3/18/2024) 53 each 3     No current facility-administered medications on file prior to visit.        Review of Systems   Constitutional: Negative.    Respiratory: Negative.     Cardiovascular: Negative.    Gastrointestinal:  Positive for constipation.   Endocrine: Negative.    Genitourinary: Negative.    Skin: Negative.    Neurological: Negative.        Objective   Physical Exam  Constitutional:       Appearance: Normal appearance.   HENT:      Head: Normocephalic and atraumatic.   Cardiovascular:      Rate and Rhythm: Normal rate and regular rhythm.   Pulmonary:      Effort: Pulmonary effort is normal.      Breath sounds: Normal breath sounds.   Abdominal:      General: Abdomen is flat. Bowel sounds are normal.      Palpations: Abdomen is soft.      Tenderness: There is no abdominal tenderness.   Musculoskeletal:         General: Normal range of motion.   Skin:     General: Skin is warm.   Neurological:      General: No focal deficit present.      Mental Status: He is alert.       /84   Pulse 87   Temp 36.5 °C (97.7 °F)   Ht 1.829 m (6')   Wt 75.3 kg (165 lb 14.4 oz)    "SpO2 95%   BMI 22.50 kg/m²      Lab Results   Component Value Date    WBC CANCELED 08/21/2023    HGB CANCELED 08/21/2023    HCT CANCELED 08/21/2023    MCV CANCELED 08/21/2023    PLT CANCELED 08/21/2023           No lab exists for component: \"LABALBU\"    No results found for: \"AFP\"  No results found for: \"TSH\"    CT abdomen pelvis w IV contrast  Status: Final result     PACS Images     Show images for CT abdomen pelvis w IV contrast  Signed by    Signed Time Phone Pager   Ruben Solares DO 8/21/2023 03:23 [phone]     Exam Information    Status Exam Begun Exam Ended   Final  8/21/2023 03:16     Study Result    Narrative   Interpreted By:  RUBEN SOLARES DO  MRN: [id number]  Patient Name: MARILIN BEJARANO     STUDY:  CT ABDOMEN AND PELVIS W IV CONTRAST;  8/21/2023 3:16 am     INDICATION:  abdominal pain .     COMPARISON:  CT abdomen and pelvis 01/04/2018     ACCESSION NUMBER(S):  [id number]     ORDERING CLINICIAN:  LELA WELLS     TECHNIQUE:  Axial CT of the abdomen and pelvis was performed. Coronal and  sagittal reconstructions were performed.     75 milliliter  OMNIPAQUE 350 contrast material were administered  intravenously without immediate complication.     FINDINGS:  LOWER CHEST:  No consolidation or pleural effusion.     ABDOMEN:     LIVER:  The liver is enlarged measuring 18.8 cm in craniocaudal diameter.  No focal lesion is identified.     BILE DUCTS:  No significant dilation.     GALLBLADDER:  Present.     PANCREAS:  No peripancreatic inflammatory changes.     SPLEEN:  Unremarkable.     ADRENAL GLANDS:  Unremarkable.     KIDNEYS AND URETERS:  Symmetrical renal enhancement.  No hydronephrosis or hydroureter is  identified.     PELVIS:     BLADDER:  Partially distended.     REPRODUCTIVE ORGANS:  The prostate measures 4.2 cm in transverse diameter.     BOWEL:  The stomach is distended with enteric contents. No bowel obstruction.  The appendix is normal.     VESSELS:  Mild atherosclerotic " calcifications at the abdominal aorta and its  branches. No abdominal aortic aneurysm.     PERITONEUM/RETROPERITONEUM/LYMPH NODES:  No free fluid or free air.  No retroperitoneal hemorrhage. No  pathologically enlarged lymph nodes are identified.     BONES AND ABDOMINAL WALL:  Multilevel degenerative changes are noted in the spine.  The abdominal wall soft tissues are within normal limits.      Impression   1.  No acute findings in the abdomen or pelvis.  2. Hepatomegaly.       Assessment/Plan   Diagnoses and all orders for this visit:  Lower abdominal pain  -     EGD; Future  -     docusate sodium (Colace) 100 mg capsule; Take 1 capsule (100 mg) by mouth 2 times a day.  -     polyethylene glycol 3350,bulk, powder; 1 Capful once daily.  Irritable bowel syndrome with constipation  -     Referral to Gastroenterology  -     docusate sodium (Colace) 100 mg capsule; Take 1 capsule (100 mg) by mouth 2 times a day.  -     polyethylene glycol 3350,bulk, powder; 1 Capful once daily.  Colon cancer screening  -     Colonoscopy Screening; Average Risk Patient; Future  -     polyethylene glycol-electrolytes (GaviLyte-C) 420 gram solution; Take 4,000 mL by mouth 1 time for 1 dose. Drink 8 oz every 10-15 minutes until solution is gone  2 day prep.

## 2024-03-18 NOTE — PATIENT INSTRUCTIONS
Lower abdominal pain  -     EGD; Future  -     docusate sodium (Colace) 100 mg capsule; Take 1 capsule (100 mg) by mouth 2 times a day.  -     polyethylene glycol 3350,bulk, powder; 1 Capful once daily.  Irritable bowel syndrome with constipation  -     Referral to Gastroenterology  -     docusate sodium (Colace) 100 mg capsule; Take 1 capsule (100 mg) by mouth 2 times a day.  -     polyethylene glycol 3350,bulk, powder; 1 Capful once daily.  Colon cancer screening  -     Colonoscopy Screening; Average Risk Patient; Future  -     polyethylene glycol-electrolytes (GaviLyte-C) 420 gram solution; Take 4,000 mL by mouth 1 time for 1 dose. Drink 8 oz every 10-15 minutes until solution is gone

## 2024-03-18 NOTE — PROGRESS NOTES
Subjective   Patient ID: Santos Contreras is a 48 y.o. male who presents for Constipation (Moves bowels daily denies blood/mucus. States he was scheduled for colon last year but felt prep did not work and he did not reschedule. ).  HPI  He has factor v leiden mutation. Chronic constipation. GERD.   No hx of colon/egd    BM: every 2 days/.   Has to some time disimpact himself.   Has rectal pain.     Has stomach pains.   Current Outpatient Medications on File Prior to Visit   Medication Sig Dispense Refill    DULoxetine (Cymbalta) 30 mg DR capsule TAKE 1 TABLET ONCE DAILY FOR 7 DAYS THEN INCREASE IT TO 2 TABLETS ONCE DAILY      fluticasone (Flovent) 110 mcg/actuation inhaler Inhale 2 puffs 2 times a day.      montelukast (Singulair) 10 mg tablet Take 1 tablet (10 mg) by mouth once daily at bedtime.      albuterol 90 mcg/actuation inhaler Inhale 2 puffs every 6 hours if needed.      azithromycin (Zithromax) 250 mg tablet Take by mouth.      budesonide (Rhinocort AQ) 32 mcg/actuation nasal spray Administer 2 sprays into affected nostril(s) once daily.      gabapentin (Neurontin) 100 mg capsule Take 1 capsule (100 mg) by mouth.      HYDROcodone-acetaminophen (Norco) 5-325 mg tablet       lidocaine PF (Xylocaine) 10 mg/mL (1 %) injection Lidocaine HCl - 1 % Injection Solution USE AS DIRECTED. Quantity: 0 Refills: 0 Ordered: 3-Mar-2022 Steven Sandy DO Start : 3-Mar-2022 Complete      methocarbamol (Robaxin) 500 mg tablet Take by mouth.      methylPREDNISolone (Medrol, Diego,) 4 mg tablets Take by mouth.      naproxen (Naprosyn) 500 mg tablet Take by mouth.      nicotine (Nicoderm CQ) 21 mg/24 hr patch Place 1 patch on the skin.      nirmatrelvir-ritonavir (Paxlovid) 300 mg (150 mg x 2)-100 mg tablet therapy pack Take by mouth.      ondansetron ODT (Zofran-ODT) 8 mg disintegrating tablet       oseltamivir (Tamiflu) 75 mg capsule       oxyCODONE-acetaminophen (Percocet) 5-325 mg tablet Take by mouth every 12 hours.       pantoprazole (ProtoNix) 20 mg EC tablet Take 1 tablet (20 mg) by mouth once daily. (Patient not taking: Reported on 3/18/2024) 30 tablet 3    plecanatide (Trulance) tablet tablet Take by mouth.      polyethylene glycol (Miralax) 17 gram/dose powder Take 17 g by mouth once daily. (Patient not taking: Reported on 3/18/2024) 510 g 3    sucralfate (Carafate) 1 gram tablet 1 tab(s) orally 3 times a day 1 hour before meals      triamcinolone acetonide (Kenalog) 10 mg/mL injection Kenalog 10 MG/ML Injection Suspension USE AS DIRECTED. Quantity: 0 Refills: 0 Ordered: 3-Mar-2022 Steven Sandy DO Start : 3-Mar-2022 Complete      varenicline (Chantix STANISLAW) 0.5 mg (11)- 1 mg (42) tablet TAKE ONE 0.5MG TABLET DAILY ON DAYS 1-3, THEN ONE 0.5MG TABLET TWICE DAILY ON DAYS 4-7, THEN ONE 1MG TABLET TWICE DAILY THEREAFTER. (Patient not taking: Reported on 3/18/2024) 53 each 3     No current facility-administered medications on file prior to visit.        Review of Systems   Constitutional: Negative.    Respiratory: Negative.     Cardiovascular: Negative.    Gastrointestinal:  Positive for constipation.   Endocrine: Negative.    Genitourinary: Negative.    Skin: Negative.    Neurological: Negative.        Objective   Physical Exam  Constitutional:       Appearance: Normal appearance.   HENT:      Head: Normocephalic and atraumatic.   Cardiovascular:      Rate and Rhythm: Normal rate and regular rhythm.   Pulmonary:      Effort: Pulmonary effort is normal.      Breath sounds: Normal breath sounds.   Abdominal:      General: Abdomen is flat. Bowel sounds are normal.      Palpations: Abdomen is soft.      Tenderness: There is no abdominal tenderness.   Musculoskeletal:         General: Normal range of motion.   Skin:     General: Skin is warm.   Neurological:      General: No focal deficit present.      Mental Status: He is alert.       /84   Pulse 87   Temp 36.5 °C (97.7 °F)   Ht 1.829 m (6')   Wt 75.3 kg (165 lb 14.4 oz)    "SpO2 95%   BMI 22.50 kg/m²      Lab Results   Component Value Date    WBC CANCELED 08/21/2023    HGB CANCELED 08/21/2023    HCT CANCELED 08/21/2023    MCV CANCELED 08/21/2023    PLT CANCELED 08/21/2023           No lab exists for component: \"LABALBU\"    No results found for: \"AFP\"  No results found for: \"TSH\"    CT abdomen pelvis w IV contrast  Status: Final result     PACS Images     Show images for CT abdomen pelvis w IV contrast  Signed by    Signed Time Phone Pager   Ruben Solares DO 8/21/2023 03:23 [phone]     Exam Information    Status Exam Begun Exam Ended   Final  8/21/2023 03:16     Study Result    Narrative   Interpreted By:  RUBEN SOLARES DO  MRN: [id number]  Patient Name: MARILIN BEJARANO     STUDY:  CT ABDOMEN AND PELVIS W IV CONTRAST;  8/21/2023 3:16 am     INDICATION:  abdominal pain .     COMPARISON:  CT abdomen and pelvis 01/04/2018     ACCESSION NUMBER(S):  [id number]     ORDERING CLINICIAN:  LELA WELLS     TECHNIQUE:  Axial CT of the abdomen and pelvis was performed. Coronal and  sagittal reconstructions were performed.     75 milliliter  OMNIPAQUE 350 contrast material were administered  intravenously without immediate complication.     FINDINGS:  LOWER CHEST:  No consolidation or pleural effusion.     ABDOMEN:     LIVER:  The liver is enlarged measuring 18.8 cm in craniocaudal diameter.  No focal lesion is identified.     BILE DUCTS:  No significant dilation.     GALLBLADDER:  Present.     PANCREAS:  No peripancreatic inflammatory changes.     SPLEEN:  Unremarkable.     ADRENAL GLANDS:  Unremarkable.     KIDNEYS AND URETERS:  Symmetrical renal enhancement.  No hydronephrosis or hydroureter is  identified.     PELVIS:     BLADDER:  Partially distended.     REPRODUCTIVE ORGANS:  The prostate measures 4.2 cm in transverse diameter.     BOWEL:  The stomach is distended with enteric contents. No bowel obstruction.  The appendix is normal.     VESSELS:  Mild atherosclerotic " calcifications at the abdominal aorta and its  branches. No abdominal aortic aneurysm.     PERITONEUM/RETROPERITONEUM/LYMPH NODES:  No free fluid or free air.  No retroperitoneal hemorrhage. No  pathologically enlarged lymph nodes are identified.     BONES AND ABDOMINAL WALL:  Multilevel degenerative changes are noted in the spine.  The abdominal wall soft tissues are within normal limits.      Impression   1.  No acute findings in the abdomen or pelvis.  2. Hepatomegaly.       Assessment/Plan   Diagnoses and all orders for this visit:  Lower abdominal pain  -     EGD; Future  -     docusate sodium (Colace) 100 mg capsule; Take 1 capsule (100 mg) by mouth 2 times a day.  -     polyethylene glycol 3350,bulk, powder; 1 Capful once daily.  Irritable bowel syndrome with constipation  -     Referral to Gastroenterology  -     docusate sodium (Colace) 100 mg capsule; Take 1 capsule (100 mg) by mouth 2 times a day.  -     polyethylene glycol 3350,bulk, powder; 1 Capful once daily.  Colon cancer screening  -     Colonoscopy Screening; Average Risk Patient; Future  -     polyethylene glycol-electrolytes (GaviLyte-C) 420 gram solution; Take 4,000 mL by mouth 1 time for 1 dose. Drink 8 oz every 10-15 minutes until solution is gone  2 day prep.

## 2024-03-26 DIAGNOSIS — Z12.11 COLON CANCER SCREENING: ICD-10-CM

## 2024-03-27 RX ORDER — POLYETHYLENE GLYCOL 3350, SODIUM CHLORIDE, SODIUM BICARBONATE, POTASSIUM CHLORIDE 420; 11.2; 5.72; 1.48 G/4L; G/4L; G/4L; G/4L
4000 POWDER, FOR SOLUTION ORAL ONCE
Qty: 4000 ML | Refills: 0 | Status: SHIPPED | OUTPATIENT
Start: 2024-03-27 | End: 2024-03-27

## 2024-03-27 ASSESSMENT — ENCOUNTER SYMPTOMS
NEUROLOGICAL NEGATIVE: 1
CONSTIPATION: 1
CARDIOVASCULAR NEGATIVE: 1
RESPIRATORY NEGATIVE: 1
ENDOCRINE NEGATIVE: 1
CONSTITUTIONAL NEGATIVE: 1

## 2024-04-16 ENCOUNTER — HOSPITAL ENCOUNTER (OUTPATIENT)
Dept: GASTROENTEROLOGY | Facility: HOSPITAL | Age: 49
Setting detail: OUTPATIENT SURGERY
Discharge: HOME | End: 2024-04-16
Payer: COMMERCIAL

## 2024-04-16 ENCOUNTER — ANESTHESIA (OUTPATIENT)
Dept: GASTROENTEROLOGY | Facility: HOSPITAL | Age: 49
End: 2024-04-16
Payer: COMMERCIAL

## 2024-04-16 ENCOUNTER — ANESTHESIA EVENT (OUTPATIENT)
Dept: GASTROENTEROLOGY | Facility: HOSPITAL | Age: 49
End: 2024-04-16
Payer: COMMERCIAL

## 2024-04-16 VITALS
HEIGHT: 72 IN | OXYGEN SATURATION: 99 % | TEMPERATURE: 97 F | WEIGHT: 163 LBS | RESPIRATION RATE: 16 BRPM | SYSTOLIC BLOOD PRESSURE: 118 MMHG | HEART RATE: 64 BPM | BODY MASS INDEX: 22.08 KG/M2 | DIASTOLIC BLOOD PRESSURE: 72 MMHG

## 2024-04-16 DIAGNOSIS — K21.9 GASTROESOPHAGEAL REFLUX DISEASE WITHOUT ESOPHAGITIS: ICD-10-CM

## 2024-04-16 DIAGNOSIS — R10.30 LOWER ABDOMINAL PAIN: Primary | ICD-10-CM

## 2024-04-16 DIAGNOSIS — Z12.11 COLON CANCER SCREENING: ICD-10-CM

## 2024-04-16 PROCEDURE — A45385 PR COLONOSCOPY,REMV LESN,SNARE: Performed by: NURSE ANESTHETIST, CERTIFIED REGISTERED

## 2024-04-16 PROCEDURE — 7100000009 HC PHASE TWO TIME - INITIAL BASE CHARGE

## 2024-04-16 PROCEDURE — 45385 COLONOSCOPY W/LESION REMOVAL: CPT | Performed by: INTERNAL MEDICINE

## 2024-04-16 PROCEDURE — 2500000004 HC RX 250 GENERAL PHARMACY W/ HCPCS (ALT 636 FOR OP/ED): Performed by: INTERNAL MEDICINE

## 2024-04-16 PROCEDURE — 3700000001 HC GENERAL ANESTHESIA TIME - INITIAL BASE CHARGE

## 2024-04-16 PROCEDURE — 7100000010 HC PHASE TWO TIME - EACH INCREMENTAL 1 MINUTE

## 2024-04-16 PROCEDURE — 2500000004 HC RX 250 GENERAL PHARMACY W/ HCPCS (ALT 636 FOR OP/ED): Performed by: NURSE ANESTHETIST, CERTIFIED REGISTERED

## 2024-04-16 PROCEDURE — 43239 EGD BIOPSY SINGLE/MULTIPLE: CPT | Performed by: INTERNAL MEDICINE

## 2024-04-16 PROCEDURE — A45385 PR COLONOSCOPY,REMV LESN,SNARE: Performed by: ANESTHESIOLOGY

## 2024-04-16 PROCEDURE — 2500000005 HC RX 250 GENERAL PHARMACY W/O HCPCS: Performed by: NURSE ANESTHETIST, CERTIFIED REGISTERED

## 2024-04-16 PROCEDURE — 88305 TISSUE EXAM BY PATHOLOGIST: CPT | Performed by: PATHOLOGY

## 2024-04-16 PROCEDURE — 88305 TISSUE EXAM BY PATHOLOGIST: CPT | Mod: TC,59,STJLAB | Performed by: INTERNAL MEDICINE

## 2024-04-16 PROCEDURE — 3700000002 HC GENERAL ANESTHESIA TIME - EACH INCREMENTAL 1 MINUTE

## 2024-04-16 RX ORDER — ONDANSETRON HYDROCHLORIDE 2 MG/ML
INJECTION, SOLUTION INTRAVENOUS AS NEEDED
Status: DISCONTINUED | OUTPATIENT
Start: 2024-04-16 | End: 2024-04-16

## 2024-04-16 RX ORDER — PROPOFOL 10 MG/ML
INJECTION, EMULSION INTRAVENOUS AS NEEDED
Status: DISCONTINUED | OUTPATIENT
Start: 2024-04-16 | End: 2024-04-16

## 2024-04-16 RX ORDER — SODIUM CHLORIDE, SODIUM LACTATE, POTASSIUM CHLORIDE, CALCIUM CHLORIDE 600; 310; 30; 20 MG/100ML; MG/100ML; MG/100ML; MG/100ML
20 INJECTION, SOLUTION INTRAVENOUS CONTINUOUS
Status: DISCONTINUED | OUTPATIENT
Start: 2024-04-16 | End: 2024-04-17 | Stop reason: HOSPADM

## 2024-04-16 RX ORDER — LIDOCAINE HYDROCHLORIDE 20 MG/ML
INJECTION, SOLUTION EPIDURAL; INFILTRATION; INTRACAUDAL; PERINEURAL AS NEEDED
Status: DISCONTINUED | OUTPATIENT
Start: 2024-04-16 | End: 2024-04-16

## 2024-04-16 RX ORDER — PROPOFOL 10 MG/ML
INJECTION, EMULSION INTRAVENOUS CONTINUOUS PRN
Status: DISCONTINUED | OUTPATIENT
Start: 2024-04-16 | End: 2024-04-16

## 2024-04-16 RX ADMIN — SODIUM CHLORIDE, POTASSIUM CHLORIDE, SODIUM LACTATE AND CALCIUM CHLORIDE: 600; 310; 30; 20 INJECTION, SOLUTION INTRAVENOUS at 12:30

## 2024-04-16 RX ADMIN — PROPOFOL 50 MG: 10 INJECTION, EMULSION INTRAVENOUS at 12:37

## 2024-04-16 RX ADMIN — ONDANSETRON 4 MG: 2 INJECTION, SOLUTION INTRAMUSCULAR; INTRAVENOUS at 12:40

## 2024-04-16 RX ADMIN — PROPOFOL 30 MG: 10 INJECTION, EMULSION INTRAVENOUS at 12:40

## 2024-04-16 RX ADMIN — PROPOFOL 50 MG: 10 INJECTION, EMULSION INTRAVENOUS at 12:38

## 2024-04-16 RX ADMIN — LIDOCAINE HYDROCHLORIDE 80 MG: 20 INJECTION, SOLUTION EPIDURAL; INFILTRATION; INTRACAUDAL; PERINEURAL at 12:37

## 2024-04-16 RX ADMIN — PROPOFOL 130 MCG/KG/MIN: 10 INJECTION, EMULSION INTRAVENOUS at 12:38

## 2024-04-16 SDOH — HEALTH STABILITY: MENTAL HEALTH: CURRENT SMOKER: 1

## 2024-04-16 ASSESSMENT — COLUMBIA-SUICIDE SEVERITY RATING SCALE - C-SSRS
6. HAVE YOU EVER DONE ANYTHING, STARTED TO DO ANYTHING, OR PREPARED TO DO ANYTHING TO END YOUR LIFE?: NO
2. HAVE YOU ACTUALLY HAD ANY THOUGHTS OF KILLING YOURSELF?: NO
1. IN THE PAST MONTH, HAVE YOU WISHED YOU WERE DEAD OR WISHED YOU COULD GO TO SLEEP AND NOT WAKE UP?: NO

## 2024-04-16 ASSESSMENT — PAIN SCALES - GENERAL
PAINLEVEL_OUTOF10: 0 - NO PAIN

## 2024-04-16 ASSESSMENT — PAIN - FUNCTIONAL ASSESSMENT
PAIN_FUNCTIONAL_ASSESSMENT: 0-10

## 2024-04-16 NOTE — ANESTHESIA POSTPROCEDURE EVALUATION
Patient: Santos Contreras    Procedure Summary       Date: 04/16/24 Room / Location: [facility]    Anesthesia Start: 1231 Anesthesia Stop: 1310    Procedures:       EGD      COLONOSCOPY Diagnosis:       Lower abdominal pain      Gastroesophageal reflux disease without esophagitis      Colon cancer screening      Lower abdominal pain    Scheduled Providers: Omar Armstrong MD Responsible Provider: Stanley Maurice MD    Anesthesia Type: MAC ASA Status: 2            Anesthesia Type: MAC    Vitals Value Taken Time   BP 98/65 04/16/24 1311   Temp 36.1 04/16/24 1311   Pulse 60 04/16/24 1311   Resp 15 04/16/24 1311   SpO2 97 04/16/24 1311       Anesthesia Post Evaluation    Patient location during evaluation: PACU  Patient participation: complete - patient participated  Level of consciousness: awake and alert  Pain management: adequate  Airway patency: patent  Cardiovascular status: acceptable  Respiratory status: acceptable, nonlabored ventilation, room air, spontaneous ventilation and unassisted  Hydration status: acceptable  Postoperative Nausea and Vomiting: none  Comments: Handoff to PACU RN in phase 2        There were no known notable events for this encounter.

## 2024-04-16 NOTE — DISCHARGE INSTRUCTIONS
Patient Instructions after a Colonoscopy      The anesthetics, sedatives or narcotics which were given to you today will be acting in your body for the next 24 hours, so you might feel a little sleepy or groggy.  This feeling should slowly wear off. Carefully read and follow the instructions.     You received sedation today:  - Do not drive or operate any machinery or power tools of any kind.   - No alcoholic beverages today, not even beer or wine.  - Do not make any important decisions or sign any legal documents.  - No over the counter medications that contain alcohol or that may cause drowsiness.  - Do not make any important decisions or sign any legal documents.    While it is common to experience mild to moderate abdominal distention, gas, or belching after your procedure, if any of these symptoms occur following discharge from the GI Lab or within one week of having your procedure, call the [facility] to be advised whether a visit to your nearest Urgent Care or Emergency Department is indicated.  Take this paper with you if you go.     - If you develop an allergic reaction to the medications that were given during your procedure such as difficulty breathing, rash, hives, severe nausea, vomiting or lightheadedness.  - If you experience chest pain, shortness of breath, severe abdominal pain, fevers and chills.  -If you develop signs and symptoms of bleeding such as blood in your spit, if your stools turn black, tarry, or bloody  - If you have not urinated within 8 hours following your procedure.  - If your IV site becomes painful, red, inflamed, or looks infected.    If you received a biopsy/polypectomy/sphincterotomy the following instructions apply below:    __ Do not use Aspirin containing products, non-steroidal medications or anti-coagulants for one week following your procedure. (Examples of these types of medications are: Advil, Arthrotec, Aleve, Coumadin, Ecotrin, Heparin, Ibuprofen,  Indocin, Motrin, Naprosyn, Nuprin, Plavix, Vioxx, and Voltarin, or their generic forms.  This list is not all-inclusive.  Check with your physician or pharmacist before resuming medications.)   __ Eat a soft diet today.  Avoid foods that are poorly digested for the next 24 hours.  These foods would include: nuts, beans, lettuce, red meats, and fried foods. Start with liquids and advance your diet as tolerated, gradually work up to eating solids.   __ Do not have a Barium Study or Enema for one week.    Your physician recommends the additional following instructions:    -You have a contact number available for emergencies. The signs and symptoms of potential delayed complications were discussed with you. You may return to normal activities tomorrow.  -Resume your previous diet.  -Continue your present medications.   -We are waiting for your pathology results.  -Your physician has recommended a repeat colonoscopy (date to be determined after pending pathology results are reviewed) for surveillance based on pathology results.  -The findings and recommendations have been discussed with you.  -The findings and recommendations were discussed with your family.  - Please see Medication Reconciliation Form for new medication/medications prescribed.       If you experience any problems or have any questions following discharge from the GI Lab, please call:        Nurse Signature                                                                        Date___________________                                                                            Patient/Responsible Party Signature                                        Date___________________

## 2024-04-16 NOTE — ANESTHESIA PREPROCEDURE EVALUATION
Patient: Santos Contreras    Procedure Information       Date/Time: 04/16/24 1130    Scheduled providers: Omar Armstrong MD    Procedures:       EGD      COLONOSCOPY    Location: [facility]            Relevant Problems   Cardiac   (+) Chest pain      Pulmonary   (+) Asthma (HHS-HCC)   (+) Mild intermittent asthma, unspecified whether complicated (HHS-HCC)      Neuro   (+) Bilateral carpal tunnel syndrome   (+) Carpal tunnel syndrome   (+) Lumbar radiculopathy, acute   (+) Mild episode of recurrent major depressive disorder (CMS-HCC)      GI   (+) Gastroesophageal reflux disease without esophagitis   (+) Irritable bowel syndrome with constipation      Liver   (+) Hepatitis-C      Hematology   (+) Factor V Leiden mutation (Multi)      Musculoskeletal   (+) Bilateral carpal tunnel syndrome   (+) Carpal tunnel syndrome   (+) Chronic pain syndrome   (+) DJD (degenerative joint disease), lumbar   (+) Spondylosis of lumbar spine      ID   (+) Hepatitis-C       Clinical information reviewed:   Tobacco  Allergies  Meds   Med Hx  Surg Hx   Fam Hx  Soc Hx        NPO Detail:  NPO/Void Status  Date of Last Liquid: 04/16/24  Time of Last Liquid: 0800  Date of Last Solid: 04/14/24  Time of Last Solid: 1700  Last Intake Type: Clear fluids  Time of Last Void: 0800         Physical Exam    Airway  Mallampati: II  TM distance: >3 FB  Neck ROM: full     Cardiovascular - normal exam  Rhythm: regular  Rate: normal     Dental - normal exam     Pulmonary - normal exam  Breath sounds clear to auscultation     Abdominal   Abdomen: soft  Bowel sounds: normal           Anesthesia Plan    History of general anesthesia?: yes  History of complications of general anesthesia?: yes    ASA 2     MAC and general   (Hx of PONV)  The patient is a current smoker.  Patient was previously instructed to abstain from smoking on day of procedure.  Patient did not smoke on day of procedure.  Education provided regarding risk of  obstructive sleep apnea.  intravenous induction   Anesthetic plan and risks discussed with patient.  Use of blood products discussed with patient who.    Plan discussed with CRNA.

## 2024-04-22 LAB
LABORATORY COMMENT REPORT: NORMAL
PATH REPORT.FINAL DX SPEC: NORMAL
PATH REPORT.GROSS SPEC: NORMAL
PATH REPORT.RELEVANT HX SPEC: NORMAL
PATH REPORT.TOTAL CANCER: NORMAL

## 2024-04-22 NOTE — RESULT ENCOUNTER NOTE
During recent colonoscopy multiple polyps were seen and completely removed.  Pathology results show these polyps as tubular adenoma.  This kind of polyp is benign but precancerous.  Based on pathology results and size of the polyps I recommend repeating colonoscopy in 2 years.    Pathology results from upper endoscopy are normal.  Which means no infection or inflammation was identified.  Do not hesitate to contact me if you have any questions or concerns.    Sincerely,

## 2024-07-16 ENCOUNTER — APPOINTMENT (OUTPATIENT)
Dept: ORTHOPEDIC SURGERY | Facility: CLINIC | Age: 49
End: 2024-07-16
Payer: COMMERCIAL

## 2024-12-13 NOTE — PROGRESS NOTES
History: Santos is here for his right shoulder.    Past medical history: Multiple  Medications: Multiple  Allergies: No known drug allergies    Please refer to the intake H&P regarding the patient's review of systems, family history and social history as was done today    HEENT: Normal  Lungs: Clear to auscultation  Heart: RRR  Abdomen: Soft, nontender  Skin: clear  Extremity: []   Contralateral exam is normal for strength, motion, stability and neurovascular assessment.    Radiographs: []     Assessment: []     Plan: []   All questions were answered today with the patient.      Scribe Attestation  By signing my name below, IKathy Scribe   attest that this documentation has been prepared under the direction and in the presence of Ryan Villela MD.

## 2024-12-16 ENCOUNTER — APPOINTMENT (OUTPATIENT)
Dept: ORTHOPEDIC SURGERY | Facility: CLINIC | Age: 49
End: 2024-12-16
Payer: COMMERCIAL

## 2025-01-25 ENCOUNTER — HOSPITAL ENCOUNTER (EMERGENCY)
Facility: HOSPITAL | Age: 50
Discharge: HOME | End: 2025-01-25
Attending: EMERGENCY MEDICINE
Payer: COMMERCIAL

## 2025-01-25 ENCOUNTER — APPOINTMENT (OUTPATIENT)
Dept: RADIOLOGY | Facility: HOSPITAL | Age: 50
End: 2025-01-25
Payer: COMMERCIAL

## 2025-01-25 VITALS
WEIGHT: 160 LBS | SYSTOLIC BLOOD PRESSURE: 127 MMHG | RESPIRATION RATE: 18 BRPM | OXYGEN SATURATION: 100 % | TEMPERATURE: 98.6 F | HEIGHT: 72 IN | BODY MASS INDEX: 21.67 KG/M2 | DIASTOLIC BLOOD PRESSURE: 86 MMHG | HEART RATE: 86 BPM

## 2025-01-25 DIAGNOSIS — S39.012A LUMBAR STRAIN, INITIAL ENCOUNTER: Primary | ICD-10-CM

## 2025-01-25 DIAGNOSIS — S99.921A RIGHT FOOT INJURY, INITIAL ENCOUNTER: ICD-10-CM

## 2025-01-25 PROCEDURE — 90715 TDAP VACCINE 7 YRS/> IM: CPT

## 2025-01-25 PROCEDURE — 2500000005 HC RX 250 GENERAL PHARMACY W/O HCPCS

## 2025-01-25 PROCEDURE — 90471 IMMUNIZATION ADMIN: CPT

## 2025-01-25 PROCEDURE — 73630 X-RAY EXAM OF FOOT: CPT | Mod: RT

## 2025-01-25 PROCEDURE — 2500000001 HC RX 250 WO HCPCS SELF ADMINISTERED DRUGS (ALT 637 FOR MEDICARE OP)

## 2025-01-25 PROCEDURE — 2500000004 HC RX 250 GENERAL PHARMACY W/ HCPCS (ALT 636 FOR OP/ED)

## 2025-01-25 PROCEDURE — 99284 EMERGENCY DEPT VISIT MOD MDM: CPT | Performed by: EMERGENCY MEDICINE

## 2025-01-25 PROCEDURE — 99284 EMERGENCY DEPT VISIT MOD MDM: CPT | Mod: 25 | Performed by: EMERGENCY MEDICINE

## 2025-01-25 PROCEDURE — 72100 X-RAY EXAM L-S SPINE 2/3 VWS: CPT | Mod: FOREIGN READ | Performed by: RADIOLOGY

## 2025-01-25 PROCEDURE — 72100 X-RAY EXAM L-S SPINE 2/3 VWS: CPT

## 2025-01-25 RX ORDER — LIDOCAINE 560 MG/1
1 PATCH PERCUTANEOUS; TOPICAL; TRANSDERMAL ONCE
Status: DISCONTINUED | OUTPATIENT
Start: 2025-01-25 | End: 2025-01-25 | Stop reason: HOSPADM

## 2025-01-25 RX ORDER — OXYCODONE AND ACETAMINOPHEN 5; 325 MG/1; MG/1
1 TABLET ORAL EVERY 6 HOURS PRN
Qty: 5 TABLET | Refills: 0 | Status: SHIPPED | OUTPATIENT
Start: 2025-01-25 | End: 2025-01-28

## 2025-01-25 RX ORDER — IBUPROFEN 800 MG/1
800 TABLET ORAL ONCE
Status: COMPLETED | OUTPATIENT
Start: 2025-01-25 | End: 2025-01-25

## 2025-01-25 RX ORDER — LIDOCAINE 50 MG/G
2 PATCH TOPICAL DAILY
Qty: 2 PATCH | Refills: 0 | Status: SHIPPED | OUTPATIENT
Start: 2025-01-25

## 2025-01-25 RX ORDER — CYCLOBENZAPRINE HCL 5 MG
5 TABLET ORAL ONCE
Status: DISCONTINUED | OUTPATIENT
Start: 2025-01-25 | End: 2025-01-25

## 2025-01-25 RX ORDER — DOXYCYCLINE 100 MG/1
100 CAPSULE ORAL 2 TIMES DAILY
Qty: 20 CAPSULE | Refills: 0 | Status: SHIPPED | OUTPATIENT
Start: 2025-01-25 | End: 2025-02-04

## 2025-01-25 RX ORDER — OXYCODONE AND ACETAMINOPHEN 5; 325 MG/1; MG/1
1 TABLET ORAL ONCE
Status: COMPLETED | OUTPATIENT
Start: 2025-01-25 | End: 2025-01-25

## 2025-01-25 RX ADMIN — LIDOCAINE 4% 1 PATCH: 40 PATCH TOPICAL at 13:16

## 2025-01-25 RX ADMIN — IBUPROFEN 800 MG: 800 TABLET, FILM COATED ORAL at 13:16

## 2025-01-25 RX ADMIN — OXYCODONE HYDROCHLORIDE AND ACETAMINOPHEN 1 TABLET: 5; 325 TABLET ORAL at 13:16

## 2025-01-25 RX ADMIN — TETANUS TOXOID, REDUCED DIPHTHERIA TOXOID AND ACELLULAR PERTUSSIS VACCINE, ADSORBED 0.5 ML: 5; 2.5; 8; 8; 2.5 SUSPENSION INTRAMUSCULAR at 13:47

## 2025-01-25 ASSESSMENT — PAIN SCALES - GENERAL
PAINLEVEL_OUTOF10: 10 - WORST POSSIBLE PAIN
PAINLEVEL_OUTOF10: 5 - MODERATE PAIN
PAINLEVEL_OUTOF10: 10 - WORST POSSIBLE PAIN

## 2025-01-25 ASSESSMENT — PAIN DESCRIPTION - DESCRIPTORS: DESCRIPTORS: SHOOTING;THROBBING

## 2025-01-25 ASSESSMENT — COLUMBIA-SUICIDE SEVERITY RATING SCALE - C-SSRS
2. HAVE YOU ACTUALLY HAD ANY THOUGHTS OF KILLING YOURSELF?: NO
1. IN THE PAST MONTH, HAVE YOU WISHED YOU WERE DEAD OR WISHED YOU COULD GO TO SLEEP AND NOT WAKE UP?: NO
6. HAVE YOU EVER DONE ANYTHING, STARTED TO DO ANYTHING, OR PREPARED TO DO ANYTHING TO END YOUR LIFE?: NO

## 2025-01-25 ASSESSMENT — PAIN - FUNCTIONAL ASSESSMENT
PAIN_FUNCTIONAL_ASSESSMENT: 0-10
PAIN_FUNCTIONAL_ASSESSMENT: 0-10

## 2025-01-25 ASSESSMENT — LIFESTYLE VARIABLES
EVER FELT BAD OR GUILTY ABOUT YOUR DRINKING: NO
HAVE YOU EVER FELT YOU SHOULD CUT DOWN ON YOUR DRINKING: NO
HAVE PEOPLE ANNOYED YOU BY CRITICIZING YOUR DRINKING: NO
TOTAL SCORE: 0
EVER HAD A DRINK FIRST THING IN THE MORNING TO STEADY YOUR NERVES TO GET RID OF A HANGOVER: NO

## 2025-01-25 ASSESSMENT — PAIN DESCRIPTION - PAIN TYPE: TYPE: ACUTE PAIN

## 2025-01-25 ASSESSMENT — PAIN DESCRIPTION - LOCATION: LOCATION: BACK

## 2025-01-25 NOTE — DISCHARGE INSTRUCTIONS
You were assessed in the ED for your back pain and your right toe injury.  According to the scans there is no acute fractures in your back.  There is a fracture in your right toe.  Your toe is infected, you were prescribed the antibiotic doxycycline to take twice a day for 10 days.    Please follow-up with your primary care provider in a week and with the referrals to the foot doctor and the orthopedic surgeon.     Please return to the ED if you have a fever greater than 101 °F for more than 24 hours that cannot be lowered with Tylenol or Motrin, if you lose feeling in your toes, if you are unable to walk, and if you have any other concerns.    Take Tylenol for the pain 4 times a day as needed, take at least 1 g at a time.  Do not take more than 4 g a day.  Take the Percocet as your breakthrough pain relief.  Switch out your lidocaine patches every 12 hours.

## 2025-01-25 NOTE — ED PROVIDER NOTES
"EMERGENCY DEPARTMENT ENCOUNTER      Pt Name: Santos Contreras  MRN: [id number]  Birthdate 1975  Date of evaluation: 1/25/2025  Provider: John Benavides DO    CHIEF COMPLAINT       Chief Complaint   Patient presents with    Toe Injury     R great toe injury 1 week ago. Shower door fell onto toe and broke nail. Pt believes its infected now.     Back Pain     Pt helped dad off the floor and felt a \"pop\" in his back. Pain currently 10/10. Radiates down R leg.      HISTORY OF PRESENT ILLNESS    Santos Contreras is a 49 y.o. year old male who presents to the ER for right toe injury and back pain.  The patient reports that 9 days ago he was trying to assist his father who fell on the ground and while lifting his father he heard his back pop and felt sharp and sudden pain.  The pain radiates down the back of his right leg and ends at his knee.  The patient says that his pain did not get better, it is worsening.  He says nothing makes it better, shifting positions, standing or sitting does not change his pain.  Movement worsens his pain.  The patient also was complaining of right toe injury.  7 days ago he was fixing a shower door when it fell on his right great toe.  He reports that the toe was lacerated, he was not seen for it.  The patient reports that yesterday the toe was inflamed, had pus coming from the laceration, he denies fevers and chills but believes it is getting infected.       PAST MEDICAL HISTORY     Past Medical History:   Diagnosis Date    Activated protein C resistance (Multi)     Factor 5 Leiden mutation, heterozygous    Anesthesia of skin 03/16/2020    Numbness and tingling    Personal history of other diseases of the respiratory system     History of asthma     CURRENT MEDICATIONS       Discharge Medication List as of 1/25/2025  2:25 PM        CONTINUE these medications which have NOT CHANGED    Details   albuterol 90 mcg/actuation inhaler Inhale 2 puffs every 6 hours if needed., Starting Fri " 3/8/2019, Historical Med      budesonide (Rhinocort AQ) 32 mcg/actuation nasal spray Administer 2 sprays into affected nostril(s) once daily., Starting Fri 3/8/2019, Historical Med      DULoxetine (Cymbalta) 30 mg DR capsule TAKE 1 TABLET ONCE DAILY FOR 7 DAYS THEN INCREASE IT TO 2 TABLETS ONCE DAILY, Historical Med      fluticasone (Flovent) 110 mcg/actuation inhaler Inhale 2 puffs 2 times a day., Starting Fri 3/8/2019, Historical Med      gabapentin (Neurontin) 100 mg capsule Take 1 capsule (100 mg) by mouth., Starting Fri 3/8/2019, Historical Med      methocarbamol (Robaxin) 500 mg tablet Take by mouth., Starting Mon 2/14/2022, Historical Med      montelukast (Singulair) 10 mg tablet Take 1 tablet (10 mg) by mouth once daily at bedtime., Starting Fri 3/8/2019, Historical Med      naproxen (Naprosyn) 500 mg tablet Take by mouth., Starting Mon 2/14/2022, Historical Med      ondansetron ODT (Zofran-ODT) 8 mg disintegrating tablet Historical Med      pantoprazole (ProtoNix) 20 mg EC tablet Take 1 tablet (20 mg) by mouth once daily., Starting Tue 2/13/2024, Normal      plecanatide (Trulance) tablet tablet Take by mouth., Starting Tue 11/22/2022, Historical Med      polyethylene glycol 3350,bulk, powder 1 Capful once daily., Starting Mon 3/18/2024, Normal      sucralfate (Carafate) 1 gram tablet 1 tab(s) orally 3 times a day 1 hour before meals, Historical Med      varenicline (Chantix STANISLAW) 0.5 mg (11)- 1 mg (42) tablet TAKE ONE 0.5MG TABLET DAILY ON DAYS 1-3, THEN ONE 0.5MG TABLET TWICE DAILY ON DAYS 4-7, THEN ONE 1MG TABLET TWICE DAILY THEREAFTER., Normal           SURGICAL HISTORY       Past Surgical History:   Procedure Laterality Date    OTHER SURGICAL HISTORY  01/09/2019    Tonsillectomy    OTHER SURGICAL HISTORY  01/09/2019    Nose surgery    OTHER SURGICAL HISTORY  01/09/2019    Knee arthroscopy     ALLERGIES     Codeine and Tramadol  FAMILY HISTORY       Family History   Problem Relation Name Age of Onset     Diabetes Mother          of other type with oral complication, without long term current use of insulin    Other (Factor V Leiden mutation) Mother          heterozygous    Hyperlipidemia Mother      Hypertension Mother      Cancer Other grandmother      SOCIAL HISTORY       Social History     Tobacco Use    Smoking status: Every Day     Current packs/day: 1.00     Average packs/day: 1 pack/day for 30.0 years (30.0 ttl pk-yrs)     Types: Cigarettes    Smokeless tobacco: Never   Substance Use Topics    Alcohol use: Not on file     Comment: rarely    Drug use: Not Currently     PHYSICAL EXAM  (up to 7 for level 4, 8 or more for level 5)     ED Triage Vitals [01/25/25 1151]   Temperature Heart Rate Respirations BP   37 °C (98.6 °F) (!) 110 20 125/61      Pulse Ox Temp Source Heart Rate Source Patient Position   100 % Temporal Monitor Sitting      BP Location FiO2 (%)     Right arm --       Physical Exam  Constitutional:       Appearance: Normal appearance.   HENT:      Head: Normocephalic and atraumatic.   Cardiovascular:      Rate and Rhythm: Normal rate and regular rhythm.      Pulses: Normal pulses.      Heart sounds: Normal heart sounds.   Pulmonary:      Effort: Pulmonary effort is normal. No respiratory distress.      Breath sounds: Normal breath sounds. No wheezing or rales.   Abdominal:      General: Abdomen is flat.      Palpations: Abdomen is soft.   Musculoskeletal:         General: Signs of injury present.      Lumbar back: Tenderness and bony tenderness present.   Skin:     General: Skin is warm and dry.      Capillary Refill: Capillary refill takes less than 2 seconds.   Neurological:      General: No focal deficit present.      Mental Status: He is alert.      Cranial Nerves: Cranial nerves 2-12 are intact.      Motor: No weakness.      Deep Tendon Reflexes: Babinski sign absent on the right side. Babinski sign absent on the left side.        DIAGNOSTIC RESULTS   LABS:  Labs Reviewed - No data to  display  All other labs were within normal range or not returned as of this dictation.  Imaging  XR lumbar spine 2-3 views   Final Result   No acute osseous abnormality.   Signed by Ziyad Pan MD      XR foot right 3+ views   Final Result   As above.   Signed by Alban Ragsdale MD         Procedure  Procedures  EMERGENCY DEPARTMENT COURSE/MDM:   Medical Decision Making    Vitals:    Vitals:    01/25/25 1151 01/25/25 1155 01/25/25 1158 01/25/25 1425   BP: 125/61   127/86   BP Location: Right arm   Right arm   Patient Position: Sitting   Sitting   Pulse: (!) 110 (!) 102  86   Resp: 20   18   Temp: 37 °C (98.6 °F)      TempSrc: Temporal      SpO2: 100%  100% 100%   Weight: 72.6 kg (160 lb)      Height: 1.829 m (6')        Santos Contreras is a male 49 y.o. who presents to the ER for right toe injury and back pain. On arrival the patients vital signs were: Afebrile, regular heart rate, normotensive, regular respiration rate, normoxic on room air. History obtained from: patient and Spouse.     Differential diagnoses include infection, fractures.    Physical exam is significant for cranial nerves II through XII being intact, sensation is within normal limits, coordination is intact.  Muscle strength of the upper extremities is 5 out of 5 bilaterally.  Muscle strength of the lower extremities is 5 out of 5 bilaterally.  No saddle paresthesia, patient is able to clench his gluteus.  There is point tenderness in the L-spine, right paraspinal muscle tenderness in the L-spine as well.  Patient reports that he never lost control of his bowel or bladder.  Right toe is injured, laceration at the center of the nailbed, no actively bleeding, no pus.  Please see media attached.  The patient does not recall when his last tetanus vaccination was.    X-ray of the lumbar spine showed moderate to severe disc space narrowing from L3-S1 but no acute fractures.  X-ray of the foot did not show any foreign bodies, it did show a bony  fragment off the dorsal aspect of the distal first digit phalanx.    The patient was given a lidocaine patch, Percocet and ibuprofen for his pain.  He was given a tetanus vaccine for his right foot injury.  On reassessment the patient feels much better, ambulating with the Ortho shoe on.    The patient was diagnosed with right foot injury and lumbar strain due to his history, scans, response to pain medication. The patient was discharged with a prescription of doxycycline, lidocaine patch, Percocet and given return precautions. The patient was instructed to follow up with podiatry, orthopedic surgery and with their PCP in one week. The patient understood and was agreeable with the plan.       ED Course as of 01/25/25 2159   Sat Jan 25, 2025   1333 XR lumbar spine 2-3 views  Moderate to severe disc  space narrowing from L3 to S1.  No acute fractures [GV]   1411 XR foot right 3+ views  Bony fragment off the dorsal aspect of the distal first digit phalanx [GV]      ED Course User Index  [GV] Libby Caceres MD         Diagnoses as of 01/25/25 2159   Lumbar strain, initial encounter   Right foot injury, initial encounter           External Records Reviewed: I reviewed recent and relevant outside records including inpatient notes, outpatient records      Shared decision making for disposition  Patient and/or patient´s representative was counseled regarding labs, imaging, likely diagnosis. All questions were answered. Recommendation was made   for discharge home. The patient agreed and was discharged home in stable condition with appropriate relevant educational materials. Return precautions were provided which included Increasing pain, weakness, loss of motion, numbness, tingling, pain out of proportion to light touch, significant temperature or color difference between sick limb and normal limb,  or worsening of your current condition despite current medical management plan..     ED Medications administered this visit:     Medications   ibuprofen tablet 800 mg (800 mg oral Given 1/25/25 1316)   oxyCODONE-acetaminophen (Percocet) 5-325 mg per tablet 1 tablet (1 tablet oral Given 1/25/25 1316)   diphth,pertus(acell),tetanus (BoostRIX) 2.5-8-5 Lf-mcg-Lf/0.5mL vaccine 0.5 mL (0.5 mL intramuscular Given 1/25/25 1347)       New Prescriptions from this visit:    Discharge Medication List as of 1/25/2025  2:25 PM        START taking these medications    Details   doxycycline (Vibramycin) 100 mg capsule Take 1 capsule (100 mg) by mouth 2 times a day for 10 days. Take with at least 8 ounces (large glass) of water, do not lie down for 30 minutes after, Starting Sat 1/25/2025, Until Tue 2/4/2025, Normal      lidocaine (Lidoderm) 5 % patch Place 2 patches over 12 hours on the skin once daily. Remove & discard patch within 12 hours or as directed by MD., Starting Sat 1/25/2025, Normal      oxyCODONE-acetaminophen (Percocet) 5-325 mg tablet Take 1 tablet by mouth every 6 hours if needed for severe pain (7 - 10) for up to 3 days., Starting Sat 1/25/2025, Until Tue 1/28/2025 at 2359, Normal             Follow-up:  Jerry Adame MD  [address] [id number]  [phone]    In 1 week          Final Impression:   1. Lumbar strain, initial encounter    2. Right foot injury, initial encounter          Please excuse any misspellings or unintended errors related to the Dragon speech recognition software used to dictate this note.    I reviewed the case with the attending ED physician. The attending ED physician agrees with the plan.      Libby Caceres MD  Resident  01/25/25 5537          The patient was seen by the resident/fellow.  I have personally performed a substantive portion of the encounter.  I have seen and examined the patient; agree with the workup, evaluation, MDM, management and diagnosis.  The care plan has been discussed with the resident; I have reviewed the resident’s note and agree with the documented findings.       I notified the patient that he will most likely lose the toenail as it appears to have been dislodged from the nail matrix.  Recommended he follow-up with podiatry this week for further evaluation to determine if anything can be done to save the toenail.  There is a fractured bone underneath, but no sign of infection deep in the bone.  Patient be treated antibiotics, and will have reevaluation by podiatry as directed.  He is also to follow-up with orthopedics for his back pain.  He does have scoliosis, and the back pain is most likely secondary to a slightly herniated disc or disc bulge causing neuropathy down the L3 dermatome.  The pain does not go down past the knee, it stops wrapping around from the back to the upper quad to just above the knee.  Again more consistent with L3 dermatome.  He is to return to emergency for any worsening back pain, loss of bowel or bladder control, leg weakness, or any worsening concerning symptoms.                                                  John Benavides,   01/25/25 7628

## 2025-01-28 ENCOUNTER — APPOINTMENT (OUTPATIENT)
Dept: PODIATRY | Facility: CLINIC | Age: 50
End: 2025-01-28
Payer: COMMERCIAL

## 2025-02-21 ENCOUNTER — APPOINTMENT (OUTPATIENT)
Dept: ORTHOPEDIC SURGERY | Facility: CLINIC | Age: 50
End: 2025-02-21
Payer: COMMERCIAL

## 2025-04-01 ENCOUNTER — APPOINTMENT (OUTPATIENT)
Dept: ORTHOPEDIC SURGERY | Facility: CLINIC | Age: 50
End: 2025-04-01
Payer: COMMERCIAL

## 2025-04-22 ENCOUNTER — APPOINTMENT (OUTPATIENT)
Dept: ORTHOPEDIC SURGERY | Facility: CLINIC | Age: 50
End: 2025-04-22
Payer: COMMERCIAL

## 2025-05-13 ENCOUNTER — APPOINTMENT (OUTPATIENT)
Dept: ORTHOPEDIC SURGERY | Facility: CLINIC | Age: 50
End: 2025-05-13
Payer: COMMERCIAL

## 2025-06-10 ENCOUNTER — APPOINTMENT (OUTPATIENT)
Dept: ORTHOPEDIC SURGERY | Facility: CLINIC | Age: 50
End: 2025-06-10
Payer: COMMERCIAL

## 2025-09-24 ENCOUNTER — APPOINTMENT (OUTPATIENT)
Dept: PRIMARY CARE | Facility: CLINIC | Age: 50
End: 2025-09-24
Payer: COMMERCIAL